# Patient Record
Sex: FEMALE | Race: BLACK OR AFRICAN AMERICAN | NOT HISPANIC OR LATINO | Employment: UNEMPLOYED | URBAN - METROPOLITAN AREA
[De-identification: names, ages, dates, MRNs, and addresses within clinical notes are randomized per-mention and may not be internally consistent; named-entity substitution may affect disease eponyms.]

---

## 2021-04-03 ENCOUNTER — APPOINTMENT (INPATIENT)
Dept: RADIOLOGY | Facility: HOSPITAL | Age: 31
DRG: 754 | End: 2021-04-03
Payer: COMMERCIAL

## 2021-04-03 ENCOUNTER — HOSPITAL ENCOUNTER (EMERGENCY)
Facility: HOSPITAL | Age: 31
End: 2021-04-03
Attending: EMERGENCY MEDICINE

## 2021-04-03 ENCOUNTER — HOSPITAL ENCOUNTER (INPATIENT)
Facility: HOSPITAL | Age: 31
LOS: 3 days | Discharge: HOME | DRG: 754 | End: 2021-04-06
Attending: PSYCHIATRY & NEUROLOGY | Admitting: PSYCHIATRY & NEUROLOGY
Payer: COMMERCIAL

## 2021-04-03 VITALS
HEART RATE: 83 BPM | OXYGEN SATURATION: 98 % | RESPIRATION RATE: 18 BRPM | SYSTOLIC BLOOD PRESSURE: 114 MMHG | TEMPERATURE: 97.6 F | DIASTOLIC BLOOD PRESSURE: 68 MMHG | WEIGHT: 220 LBS

## 2021-04-03 DIAGNOSIS — F32.9 MAJOR DEPRESSIVE DISORDER: Primary | ICD-10-CM

## 2021-04-03 DIAGNOSIS — T50.902A INTENTIONAL DRUG OVERDOSE (HCC): Primary | ICD-10-CM

## 2021-04-03 DIAGNOSIS — Z00.8 MEDICAL CLEARANCE FOR PSYCHIATRIC ADMISSION: ICD-10-CM

## 2021-04-03 DIAGNOSIS — T14.91XA SUICIDE ATTEMPT (HCC): ICD-10-CM

## 2021-04-03 DIAGNOSIS — U07.1 COVID-19: ICD-10-CM

## 2021-04-03 PROBLEM — Z98.84 H/O GASTRIC BYPASS: Status: ACTIVE | Noted: 2021-04-03

## 2021-04-03 LAB
ALBUMIN SERPL BCP-MCNC: 3.5 G/DL (ref 3.5–5)
ALP SERPL-CCNC: 81 U/L (ref 46–116)
ALT SERPL W P-5'-P-CCNC: 40 U/L (ref 12–78)
AMPHETAMINES SERPL QL SCN: NEGATIVE
ANION GAP SERPL CALCULATED.3IONS-SCNC: 12 MMOL/L (ref 4–13)
APAP SERPL-MCNC: <2 UG/ML (ref 10–20)
AST SERPL W P-5'-P-CCNC: 35 U/L (ref 5–45)
BARBITURATES UR QL: NEGATIVE
BASOPHILS # BLD AUTO: 0.01 THOUSANDS/ΜL (ref 0–0.1)
BASOPHILS NFR BLD AUTO: 0 % (ref 0–1)
BENZODIAZ UR QL: NEGATIVE
BILIRUB DIRECT SERPL-MCNC: 0.11 MG/DL (ref 0–0.2)
BILIRUB SERPL-MCNC: 0.18 MG/DL (ref 0.2–1)
BUN SERPL-MCNC: 7 MG/DL (ref 5–25)
CALCIUM SERPL-MCNC: 8.6 MG/DL (ref 8.3–10.1)
CHLORIDE SERPL-SCNC: 102 MMOL/L (ref 100–108)
CO2 SERPL-SCNC: 25 MMOL/L (ref 21–32)
COCAINE UR QL: NEGATIVE
CREAT SERPL-MCNC: 0.93 MG/DL (ref 0.6–1.3)
EOSINOPHIL # BLD AUTO: 0.07 THOUSAND/ΜL (ref 0–0.61)
EOSINOPHIL NFR BLD AUTO: 2 % (ref 0–6)
ERYTHROCYTE [DISTWIDTH] IN BLOOD BY AUTOMATED COUNT: 13.7 % (ref 11.6–15.1)
ETHANOL SERPL-MCNC: <3 MG/DL (ref 0–3)
EXT PREG TEST URINE: NEGATIVE
EXT. CONTROL ED NAV: NORMAL
FLUAV RNA RESP QL NAA+PROBE: NEGATIVE
FLUBV RNA RESP QL NAA+PROBE: NEGATIVE
GFR SERPL CREATININE-BSD FRML MDRD: 94 ML/MIN/1.73SQ M
GLUCOSE SERPL-MCNC: 128 MG/DL (ref 65–140)
HCT VFR BLD AUTO: 39.5 % (ref 34.8–46.1)
HGB BLD-MCNC: 12.4 G/DL (ref 11.5–15.4)
IMM GRANULOCYTES # BLD AUTO: 0.02 THOUSAND/UL (ref 0–0.2)
IMM GRANULOCYTES NFR BLD AUTO: 1 % (ref 0–2)
LIPASE SERPL-CCNC: 123 U/L (ref 73–393)
LYMPHOCYTES # BLD AUTO: 1.54 THOUSANDS/ΜL (ref 0.6–4.47)
LYMPHOCYTES NFR BLD AUTO: 46 % (ref 14–44)
MCH RBC QN AUTO: 29.2 PG (ref 26.8–34.3)
MCHC RBC AUTO-ENTMCNC: 31.4 G/DL (ref 31.4–37.4)
MCV RBC AUTO: 93 FL (ref 82–98)
METHADONE UR QL: NEGATIVE
MONOCYTES # BLD AUTO: 0.58 THOUSAND/ΜL (ref 0.17–1.22)
MONOCYTES NFR BLD AUTO: 17 % (ref 4–12)
NEUTROPHILS # BLD AUTO: 1.16 THOUSANDS/ΜL (ref 1.85–7.62)
NEUTS SEG NFR BLD AUTO: 34 % (ref 43–75)
NRBC BLD AUTO-RTO: 0 /100 WBCS
OPIATES UR QL SCN: NEGATIVE
OXYCODONE+OXYMORPHONE UR QL SCN: NEGATIVE
PCP UR QL: NEGATIVE
PLATELET # BLD AUTO: 235 THOUSANDS/UL (ref 149–390)
PMV BLD AUTO: 9.3 FL (ref 8.9–12.7)
POTASSIUM SERPL-SCNC: 3.5 MMOL/L (ref 3.5–5.3)
PROT SERPL-MCNC: 7.7 G/DL (ref 6.4–8.2)
RBC # BLD AUTO: 4.24 MILLION/UL (ref 3.81–5.12)
RSV RNA RESP QL NAA+PROBE: NEGATIVE
SALICYLATES SERPL-MCNC: <3 MG/DL (ref 3–20)
SARS-COV-2 RNA RESP QL NAA+PROBE: POSITIVE
SODIUM SERPL-SCNC: 139 MMOL/L (ref 136–145)
THC UR QL: NEGATIVE
WBC # BLD AUTO: 3.38 THOUSAND/UL (ref 4.31–10.16)

## 2021-04-03 PROCEDURE — 71045 X-RAY EXAM CHEST 1 VIEW: CPT

## 2021-04-03 PROCEDURE — 80048 BASIC METABOLIC PNL TOTAL CA: CPT | Performed by: EMERGENCY MEDICINE

## 2021-04-03 PROCEDURE — 82077 ASSAY SPEC XCP UR&BREATH IA: CPT | Performed by: EMERGENCY MEDICINE

## 2021-04-03 PROCEDURE — 81025 URINE PREGNANCY TEST: CPT | Performed by: EMERGENCY MEDICINE

## 2021-04-03 PROCEDURE — 0241U HB NFCT DS VIR RESP RNA 4 TRGT: CPT | Performed by: EMERGENCY MEDICINE

## 2021-04-03 PROCEDURE — 96360 HYDRATION IV INFUSION INIT: CPT

## 2021-04-03 PROCEDURE — 80307 DRUG TEST PRSMV CHEM ANLYZR: CPT | Performed by: EMERGENCY MEDICINE

## 2021-04-03 PROCEDURE — 36415 COLL VENOUS BLD VENIPUNCTURE: CPT | Performed by: EMERGENCY MEDICINE

## 2021-04-03 PROCEDURE — 96361 HYDRATE IV INFUSION ADD-ON: CPT

## 2021-04-03 PROCEDURE — 80076 HEPATIC FUNCTION PANEL: CPT | Performed by: EMERGENCY MEDICINE

## 2021-04-03 PROCEDURE — 93005 ELECTROCARDIOGRAM TRACING: CPT

## 2021-04-03 PROCEDURE — 83690 ASSAY OF LIPASE: CPT | Performed by: EMERGENCY MEDICINE

## 2021-04-03 PROCEDURE — 80179 DRUG ASSAY SALICYLATE: CPT | Performed by: EMERGENCY MEDICINE

## 2021-04-03 PROCEDURE — 80143 DRUG ASSAY ACETAMINOPHEN: CPT | Performed by: EMERGENCY MEDICINE

## 2021-04-03 PROCEDURE — 99285 EMERGENCY DEPT VISIT HI MDM: CPT

## 2021-04-03 PROCEDURE — 99253 IP/OBS CNSLTJ NEW/EST LOW 45: CPT | Performed by: INTERNAL MEDICINE

## 2021-04-03 PROCEDURE — 99285 EMERGENCY DEPT VISIT HI MDM: CPT | Performed by: EMERGENCY MEDICINE

## 2021-04-03 PROCEDURE — 85025 COMPLETE CBC W/AUTO DIFF WBC: CPT | Performed by: EMERGENCY MEDICINE

## 2021-04-03 RX ORDER — MELATONIN
2000 DAILY
Status: DISCONTINUED | OUTPATIENT
Start: 2021-04-04 | End: 2021-04-06 | Stop reason: HOSPADM

## 2021-04-03 RX ORDER — OLANZAPINE 10 MG/1
5 INJECTION, POWDER, LYOPHILIZED, FOR SOLUTION INTRAMUSCULAR
Status: CANCELLED | OUTPATIENT
Start: 2021-04-03

## 2021-04-03 RX ORDER — POLYETHYLENE GLYCOL 3350 17 G/17G
17 POWDER, FOR SOLUTION ORAL DAILY PRN
Status: CANCELLED | OUTPATIENT
Start: 2021-04-03

## 2021-04-03 RX ORDER — LORAZEPAM 2 MG/ML
2 INJECTION INTRAMUSCULAR EVERY 6 HOURS PRN
Status: DISCONTINUED | OUTPATIENT
Start: 2021-04-03 | End: 2021-04-06 | Stop reason: HOSPADM

## 2021-04-03 RX ORDER — POLYETHYLENE GLYCOL 3350 17 G/17G
17 POWDER, FOR SOLUTION ORAL DAILY PRN
Status: DISCONTINUED | OUTPATIENT
Start: 2021-04-03 | End: 2021-04-06 | Stop reason: HOSPADM

## 2021-04-03 RX ORDER — MAGNESIUM HYDROXIDE/ALUMINUM HYDROXICE/SIMETHICONE 120; 1200; 1200 MG/30ML; MG/30ML; MG/30ML
30 SUSPENSION ORAL EVERY 4 HOURS PRN
Status: DISCONTINUED | OUTPATIENT
Start: 2021-04-03 | End: 2021-04-06 | Stop reason: HOSPADM

## 2021-04-03 RX ORDER — ACETAMINOPHEN 325 MG/1
975 TABLET ORAL EVERY 6 HOURS PRN
Status: DISCONTINUED | OUTPATIENT
Start: 2021-04-03 | End: 2021-04-06 | Stop reason: HOSPADM

## 2021-04-03 RX ORDER — BENZTROPINE MESYLATE 1 MG/ML
1 INJECTION INTRAMUSCULAR; INTRAVENOUS EVERY 6 HOURS PRN
Status: DISCONTINUED | OUTPATIENT
Start: 2021-04-03 | End: 2021-04-06 | Stop reason: HOSPADM

## 2021-04-03 RX ORDER — OLANZAPINE 2.5 MG/1
5 TABLET ORAL
Status: CANCELLED | OUTPATIENT
Start: 2021-04-03

## 2021-04-03 RX ORDER — MAGNESIUM HYDROXIDE/ALUMINUM HYDROXICE/SIMETHICONE 120; 1200; 1200 MG/30ML; MG/30ML; MG/30ML
30 SUSPENSION ORAL EVERY 4 HOURS PRN
Status: CANCELLED | OUTPATIENT
Start: 2021-04-03

## 2021-04-03 RX ORDER — ACETAMINOPHEN 325 MG/1
975 TABLET ORAL EVERY 6 HOURS PRN
Status: CANCELLED | OUTPATIENT
Start: 2021-04-03

## 2021-04-03 RX ORDER — ACETAMINOPHEN 325 MG/1
650 TABLET ORAL EVERY 4 HOURS PRN
Status: DISCONTINUED | OUTPATIENT
Start: 2021-04-03 | End: 2021-04-06 | Stop reason: HOSPADM

## 2021-04-03 RX ORDER — ACETAMINOPHEN 325 MG/1
650 TABLET ORAL EVERY 4 HOURS PRN
Status: CANCELLED | OUTPATIENT
Start: 2021-04-03

## 2021-04-03 RX ORDER — OLANZAPINE 5 MG/1
5 TABLET ORAL
Status: DISCONTINUED | OUTPATIENT
Start: 2021-04-03 | End: 2021-04-06 | Stop reason: HOSPADM

## 2021-04-03 RX ORDER — BENZTROPINE MESYLATE 1 MG/ML
1 INJECTION INTRAMUSCULAR; INTRAVENOUS EVERY 6 HOURS PRN
Status: CANCELLED | OUTPATIENT
Start: 2021-04-03

## 2021-04-03 RX ORDER — ASCORBIC ACID 500 MG
1000 TABLET ORAL 2 TIMES DAILY
Status: DISCONTINUED | OUTPATIENT
Start: 2021-04-03 | End: 2021-04-06 | Stop reason: HOSPADM

## 2021-04-03 RX ORDER — OLANZAPINE 10 MG/1
5 INJECTION, POWDER, LYOPHILIZED, FOR SOLUTION INTRAMUSCULAR
Status: DISCONTINUED | OUTPATIENT
Start: 2021-04-03 | End: 2021-04-06 | Stop reason: HOSPADM

## 2021-04-03 RX ORDER — LORAZEPAM 2 MG/ML
2 INJECTION INTRAMUSCULAR EVERY 6 HOURS PRN
Status: CANCELLED | OUTPATIENT
Start: 2021-04-03

## 2021-04-03 RX ORDER — ZINC SULFATE 50(220)MG
220 CAPSULE ORAL DAILY
Status: DISCONTINUED | OUTPATIENT
Start: 2021-04-04 | End: 2021-04-06 | Stop reason: HOSPADM

## 2021-04-03 RX ORDER — GABAPENTIN 300 MG/1
300 CAPSULE ORAL EVERY 6 HOURS PRN
Status: DISCONTINUED | OUTPATIENT
Start: 2021-04-03 | End: 2021-04-06 | Stop reason: HOSPADM

## 2021-04-03 RX ORDER — LOPERAMIDE HYDROCHLORIDE 2 MG/1
2 CAPSULE ORAL ONCE
Status: COMPLETED | OUTPATIENT
Start: 2021-04-03 | End: 2021-04-03

## 2021-04-03 RX ORDER — GABAPENTIN 300 MG/1
300 CAPSULE ORAL EVERY 6 HOURS PRN
Status: CANCELLED | OUTPATIENT
Start: 2021-04-03

## 2021-04-03 RX ADMIN — ACTIVATED CHARCOAL 25 G: 208 SUSPENSION ORAL at 00:55

## 2021-04-03 RX ADMIN — SODIUM CHLORIDE 1000 ML: 0.9 INJECTION, SOLUTION INTRAVENOUS at 00:55

## 2021-04-03 RX ADMIN — LOPERAMIDE HYDROCHLORIDE 2 MG: 2 CAPSULE ORAL at 15:14

## 2021-04-03 NOTE — ED CARE HANDOFF
Emergency Department Sign Out Note        Sign out and transfer of care from [place]  See Separate Emergency Department note  The patient, Gatrh Ruffin, was evaluated by the previous provider for SI - intentional OD on Trazadone  Workup Completed:  Medical clearance after trazodone overdose  ED Course / Workup Pending (followup): Patient has repeat EKG at noon, medically cleared for psychiatric care  However during a screening, patient is found to be COVID positive  Patient will require a COVID positive bed for placement  A 302 has been signed if needed as this patient requires inpatient psychiatric care after intentional overdose  ED Course as of Apr 03 1558   Sat Apr 03, 2021   1238 Repeat EKG, normal QT  Patient is medically cleared for the bubble  Awaiting urine for rapid drug screen and urine pregnancy test   COVID swab pending  1316 SARS-COV-2(!): Positive     Procedures  MDM    Disposition  Final diagnoses:   Intentional drug overdose ([facility] [address] )   Suicide attempt [facility])     Time reflects when diagnosis was documented in both MDM as applicable and the Disposition within this note     Time User Action Codes Description Comment    4/3/2021 12:59 AM Tosin Serum Add [T50 902A] Intentional drug overdose ([facility] [address] )     4/3/2021 12:59 AM Tosin Serum Add Isaac Eng Suicide attempt [facility])       ED Disposition     ED Disposition Condition Date/Time Comment    Transfer to [facility] Apr 3, 2021 12:59 AM Garth Ruffin should be transferred out to [facility] and has been medically cleared  MD Documentation      Most Recent Value   Sending MD Dr Rajwinder Ordonez    None       Patient's Medications    No medications on file     No discharge procedures on file         ED Provider  Electronically Signed by     Srinivas Nunez DO  04/04/21 7503

## 2021-04-03 NOTE — ED PROVIDER NOTES
History  Chief Complaint   Patient presents with    Overdose - Intentional     Pt states that she took 20 Trazadone along with drinking Hennasy approx 30 min ago with the intent of killing herself  25-year-old female presents for evaluation after an intentional overdose  The patient states that she took 20 pills of trazodone (patient is unsure of dose) along with about a cup of cognac about 35 minutes prior to arrival   She states that she was drinking and took the medication because I do not want to be here anymore        History provided by:  Patient   used: No    Overdose - Intentional  Ingested substance:  Alcohol and prescription medication  Time since incident:  35 minutes  Ingestion amount:  "20 pills"  Witnesses present: no    Called poison control: yes    Incident location: hotel  Context: depression, intentional ingestion and suicide attempt        None       History reviewed  No pertinent past medical history  Past Surgical History:   Procedure Laterality Date    BARIATRIC SURGERY      gastric sleeve     SECTION         History reviewed  No pertinent family history  I have reviewed and agree with the history as documented      E-Cigarette/Vaping    E-Cigarette Use Current Every Day User      E-Cigarette/Vaping Substances    Nicotine No     THC No     CBD Yes     Flavoring No     Other No     Unknown No      Social History     Tobacco Use    Smoking status: Never Smoker    Smokeless tobacco: Never Used   Substance Use Topics    Alcohol use: Yes     Frequency: 2-4 times a month     Drinks per session: 3 or 4    Drug use: Yes     Types: Marijuana       Review of Systems    Physical Exam  Physical Exam    Vital Signs  ED Triage Vitals [21 0028]   Temperature Pulse Respirations Blood Pressure SpO2   97 6 °F (36 4 °C) 92 18 126/80 98 %      Temp Source Heart Rate Source Patient Position - Orthostatic VS BP Location FiO2 (%)   Tympanic Monitor Sitting Left arm --      Pain Score       No Pain           Vitals:    04/03/21 0028   BP: 126/80   Pulse: 92   Patient Position - Orthostatic VS: Sitting         Visual Acuity      ED Medications  Medications   sodium chloride 0 9 % bolus 1,000 mL (has no administration in time range)   charcoal activated ([place] INSTA-LEONELA) oral liquid 25 g (has no administration in time range)       Diagnostic Studies  Results Reviewed     Procedure Component Value Units Date/Time    Acetaminophen level-If concentration is detectable, please discuss with medical  on call  [[id number]]     Lab Status: No result Specimen: Blood     Basic metabolic panel [[id number]]     Lab Status: No result Specimen: Blood     Hepatic function panel [[id number]]     Lab Status: No result Specimen: Blood     CBC and differential [[id number]]     Lab Status: No result Specimen: Blood     Lipase [[id number]]     Lab Status: No result Specimen: Blood     Ethanol [[id number]]     Lab Status: No result Specimen: Blood     Salicylate level [[id number]]     Lab Status: No result Specimen: Blood                  No orders to display              Procedures  Procedures         ED Course                                           MDM    Disposition  Final diagnoses:   None     ED Disposition     None      Follow-up Information    None         Patient's Medications    No medications on file     No discharge procedures on file      PDMP Review     None          ED Provider  Electronically Signed by

## 2021-04-03 NOTE — LETTER
[address] [address]  [address] [place] [address]  Dept: [phone]      [id number] TRANSFER CONSENT    NAME Tarah BRADSHAW 1990                              MRN [id number]    I have been informed of my rights regarding examination, treatment, and transfer   by Dr Kaya Anna MD    Benefits: Specialized equipment and/or services available at the receiving facility (Include comment)________________________(inpatient behavioral health treatment)    Risks: Potential for delay in receiving treatment      Transfer Request   I acknowledge that my medical condition has been evaluated and explained to me by the emergency department physician or other qualified medical person and/or my attending physician who has recommended and offered to me further medical examination and treatment  I understand the Hospital's obligation with respect to the treatment and stabilization of my emergency medical condition  I nevertheless request to be transferred  I release the Hospital, the doctor, and any other persons caring for me from all responsibility or liability for any injury or ill effects that may result from my transfer and agree to accept all responsibility for the consequences of my choice to transfer, rather than receive stabilizing treatment at the Hospital  I understand that because the transfer is my request, my insurance may not provide reimbursement for the services  The Hospital will assist and direct me and my family in how to make arrangements for transfer, but the hospital is not liable for any fees charged by the transport service  In spite of this understanding, I refuse to consent to further medical examination and treatment which has been offered to me, and request transfer to  [address] [place], [place]   I authorize the performance of emergency medical procedures and treatments upon me in both transit and upon arrival at the receiving facility  Additionally, I authorize the release of any and all medical records to the receiving facility and request they be transported with me, if possible  I authorize the performance of emergency medical procedures and treatments upon me in both transit and upon arrival at the receiving facility  Additionally, I authorize the release of any and all medical records to the receiving facility and request they be transported with me, if possible  I understand that the safest mode of transportation during a medical emergency is an ambulance and that the Hospital advocates the use of this mode of transport  Risks of traveling to the receiving facility by car, including absence of medical control, life sustaining equipment, such as oxygen, and medical personnel has been explained to me and I fully understand them  (ROSLYN CORRECT BOX BELOW)  [x ]  I consent to the stated transfer and to be transported by ambulance/helicopter  [  ]  I consent to the stated transfer, but refuse transportation by ambulance and accept full responsibility for my transportation by car  I understand the risks of non-ambulance transfers and I exonerate the Hospital and its staff from any deterioration in my condition that results from this refusal     X___________________________________________    DATE  21  TIME________  Signature of patient or legally responsible individual signing on patient behalf           RELATIONSHIP TO PATIENT_________________________          Provider Certification    NAME [place]                                         1990                              MRN [id number]    A medical screening exam was performed on the above named patient  Based on the examination:    Condition Necessitating Transfer The primary encounter diagnosis was Intentional drug overdose ([facility] [address] )   A diagnosis of Suicide attempt [facility]) was also pertinent to this visit  Patient Condition: The patient has been stabilized such that within reasonable medical probability, no material deterioration of the patient condition or the condition of the unborn child(sundar) is likely to result from the transfer    Reason for Transfer: Level of Care needed not available at this facility    Transfer Requirements: Facility [address] [facility], [address] [place], [place]   · Space available and qualified personnel available for treatment as acknowledged by Charmayne Foil, [phone]  · Agreed to accept transfer and to provide appropriate medical treatment as acknowledged by       Dr Valerie Josue  · Appropriate medical records of the examination and treatment of the patient are provided at the time of transfer   [address] [facility],[address] _______  · Transfer will be performed by qualified personnel from                              and appropriate transfer equipment as required, including the use of necessary and appropriate life support measures  Provider Certification: I have examined the patient and explained the following risks and benefits of being transferred/refusing transfer to the patient/family:  General risk, such as traffic hazards, adverse weather conditions, rough terrain or turbulence, possible failure of equipment (including vehicle or aircraft), or consequences of actions of persons outside the control of the transport personnel      Based on these reasonable risks and benefits to the patient and/or the unborn child(sundar), and based upon the information available at the time of the patients examination, I certify that the medical benefits reasonably to be expected from the provision of appropriate medical treatments at another medical facility outweigh the increasing risks, if any, to the individuals medical condition, and in the case of labor to the unborn child, from effecting the transfer      X____________________________________________ DATE 04/03/21        TIME_______      ORIGINAL - SEND TO MEDICAL RECORDS   COPY - SEND WITH PATIENT DURING TRANSFER
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
## 2021-04-03 NOTE — ED NOTES
[facility] is accepted at [facility]  Patient is accepted by Dr Jose Alejandro Birmingham per Cruz Gonzalez, Intake  Transportation is arranged with [facility]  Transportation is scheduled for *1920  Patient may go to the floor at upon arrival       Nurse report is to be called to [phone] prior to patient transfer

## 2021-04-03 NOTE — ED NOTES
Pt presented to the ED from home accompanied by boyfriend after allegedly ingesting aprox 20 trazodone pills (unknown dosage) impulsively  Pt reports having a hx of IP tx, OP svcs, and SA's in the past  Pt reports having a dx of schizophrenia  Pt denies any hx of self-injurious behaviors  Pt is cooperative, maintains fair eye contact and engages in conversation  Pt reports "she impulsively ingested the pills due to things just piling up and feeling hopeless"  Pt denies AVH's  Pt reports being prescribed Wellbutrin but not taking it for aprox 4 mo's ago due to "not liking the way it made her feel"  Pt reports poor appetite and has times "where is she awake 2 days in a row"  Pt reports a poor appetite and not always able to hold food down  Pt adds, "I don't eat much but I also had gastric sleeve procedure done in the past  I usually dont have an appetite"  Pt reports having had OP svcs but then they were canceled due to [place] pandemic and never re-scheduled  Pt denies legal issues, use of tobacco products, and confirms occasional use of THC  Pt uses ETOH socially  Pt reports currently residing w/boyfriend at a hotel in [address] [place] [address] at this time due to recent move from [place] to [place]  Pt is willing to sign a 201 committment  CW read and reviewed rights w/pt  Dr Myron Lowe and pt have completed 201 documents  CW notes pt is COVID-19 Positive and will require in network placement  As per intake, in network beds are available at this time  CW sent clinicals for review      TDS, CW

## 2021-04-03 NOTE — ED NOTES
CW provided tech w/cup of water for pt  Intake is reviewing pt's chart  Intake is currently awaiting lab results to present to doctor      TDS, CW

## 2021-04-04 PROBLEM — F32.9 MAJOR DEPRESSIVE DISORDER: Status: ACTIVE | Noted: 2021-04-04

## 2021-04-04 PROBLEM — F43.10 PTSD (POST-TRAUMATIC STRESS DISORDER): Status: ACTIVE | Noted: 2021-04-04

## 2021-04-04 LAB
CK SERPL-CCNC: 43 U/L (ref 30–135)
CRP SERPL QL: 7.7 MG/L
D DIMER PPP FEU-MCNC: 0.57 UG/ML FEU
FERRITIN SERPL-MCNC: 40 NG/ML (ref 8–388)
NT-PROBNP SERPL-MCNC: 18.7 PG/ML (ref 0–299)
PROCALCITONIN SERPL-MCNC: <0.05 NG/ML
TROPONIN I SERPL-MCNC: <0.01 NG/ML (ref 0–0.03)

## 2021-04-04 PROCEDURE — 82728 ASSAY OF FERRITIN: CPT | Performed by: INTERNAL MEDICINE

## 2021-04-04 PROCEDURE — 82550 ASSAY OF CK (CPK): CPT | Performed by: INTERNAL MEDICINE

## 2021-04-04 PROCEDURE — 86140 C-REACTIVE PROTEIN: CPT | Performed by: INTERNAL MEDICINE

## 2021-04-04 PROCEDURE — 93005 ELECTROCARDIOGRAM TRACING: CPT

## 2021-04-04 PROCEDURE — 99221 1ST HOSP IP/OBS SF/LOW 40: CPT | Performed by: PSYCHIATRY & NEUROLOGY

## 2021-04-04 PROCEDURE — 83880 ASSAY OF NATRIURETIC PEPTIDE: CPT | Performed by: INTERNAL MEDICINE

## 2021-04-04 PROCEDURE — 84145 PROCALCITONIN (PCT): CPT | Performed by: INTERNAL MEDICINE

## 2021-04-04 PROCEDURE — 84484 ASSAY OF TROPONIN QUANT: CPT | Performed by: INTERNAL MEDICINE

## 2021-04-04 PROCEDURE — 85379 FIBRIN DEGRADATION QUANT: CPT | Performed by: INTERNAL MEDICINE

## 2021-04-04 RX ORDER — FLUOXETINE HYDROCHLORIDE 20 MG/1
20 CAPSULE ORAL DAILY
Status: DISCONTINUED | OUTPATIENT
Start: 2021-04-04 | End: 2021-04-06 | Stop reason: HOSPADM

## 2021-04-04 RX ADMIN — FLUOXETINE 20 MG: 20 CAPSULE ORAL at 13:43

## 2021-04-04 RX ADMIN — OXYCODONE HYDROCHLORIDE AND ACETAMINOPHEN 1000 MG: 500 TABLET ORAL at 08:02

## 2021-04-04 RX ADMIN — OXYCODONE HYDROCHLORIDE AND ACETAMINOPHEN 1000 MG: 500 TABLET ORAL at 17:05

## 2021-04-04 RX ADMIN — ZINC SULFATE 220 MG (50 MG) CAPSULE 220 MG: CAPSULE at 08:02

## 2021-04-04 RX ADMIN — CHOLECALCIFEROL (VITAMIN D3) 2000 UNITS: 25 TAB ORAL at 08:02
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
## 2021-04-04 NOTE — ASSESSMENT & PLAN NOTE
· Patient has a history of gastric sleeve in 2017  · Patient is requesting a regular diet with normal portions; however, she was encouraged to eat small amounts throughout the day

## 2021-04-04 NOTE — NURSING NOTE
Pt is calm and cooperative  Pt reports depression and anxiety, denies SI  Pt is pleasant when approached, reports she wants help with her depression  Pt cooperative with EKG and blood work, hesitant to take Prozac, she is compliant with all medications

## 2021-04-04 NOTE — ASSESSMENT & PLAN NOTE
 The patient was evaluated and is medically clear for admission to the [facility] for treatment of the underlying psychiatric illness

## 2021-04-04 NOTE — TREATMENT PLAN
TREATMENT PLAN REVIEW - [facility] Jina Cespedes 26 y o  1990 female MRN: [id number]    [address] Room / Bed: Yusuf Sorto/OABHU 221-42 Encounter: [id number]        Admit Date/Time:  4/3/2021  8:33 PM    Treatment Team: Attending Provider: Ivana Ryder MD; Physician Assistant: Merrill Carrillo PA-C; Patient Care Assistant: Arnaldo Miller; Registered Nurse: Lucius Pringle RN; Resident: Gaye Mata DO; Patient Care Assistant: Meche Ellington    Diagnosis: Principal Problem:    Major depressive disorder  Active Problems:    Medical clearance for psychiatric admission    COVID-19    H/O gastric bypass    PTSD (post-traumatic stress disorder)    Patient Strengths/Assets: average or above intelligence, capable of independent living, cooperative, communication skills      Patient Barriers/Limitations: chronic mental illness    Short Term Goals: decrease in depressive symptoms, decrease in anxiety symptoms    Long Term Goals: improvement in depression, improvement in anxiety, free of suicidal thoughts    Progress Towards Goals: starting psychiatric medications as prescribed    Recommended Treatment: medication management, patient medication education, group therapy, milieu therapy, continued Behavioral Health psychiatric evaluation/assessment process     Treatment Frequency: daily medication monitoring, group and milieu therapy daily, monitoring through interdisciplinary rounds, monitoring through weekly patient care conferences    Expected Discharge Date: 7 days - 4/11/2021    Discharge Plan: return to previous living arrangement    Treatment Plan Created/Updated By: Gaye Mata DO

## 2021-04-04 NOTE — CONSULTS
Mandi Smith 1990, 26 y o  female MRN: [id number]  Unit/Bed#: Rebecca Ricketts 785-28 Encounter: [id number]  Primary Care Provider: No primary care provider on file  Date and time admitted to hospital: 4/3/2021  8:33 PM    Inpatient consult for Medical Clearance for [facility] patient  Consult performed by: Barby Wright PA-C  Consult ordered by: Eugenio Ballard MD          Medical clearance for psychiatric admission  Assessment & Plan   The patient was evaluated and is medically clear for admission to the [facility] for treatment of the underlying psychiatric illness  COVID-19  Assessment & Plan  · Patient tested positive for COVID-19 on 04/03/2021  Patient currently lives in a hotel in [facility] as she is moving from [address] [place] [address] to [address] [place] [address] and currently looking for permanent housing  She is unaware of any COVID positive contacts  Patient started with upper respiratory symptoms yesterday  Admitted under mild pathway  Oxygen requirement: RA  Diagnostics: Ordered for the AM  Troponin:   Procalcitonin:   D-dimer:   CPK:   CRP:  Ferritin:   ProBNP:  CXR:   Treatment:  Vitamin-C, vitamin-D and zinc (continue through 4/10/2021)  Supportive care      H/O gastric bypass  Assessment & Plan  · Patient has a history of gastric sleeve in 2017  · Patient is requesting a regular diet with normal portions; however, she was encouraged to eat small amounts throughout the day  ECT Clearance:   History of recent seizure or stroke:  No   History of pheochromocytoma:  No   History of active bleeding (Intracranial hemorrhage, aneurysm or AVM):  No   History of metallic implants in the head or neck:  No   History of increased intracranial pressure with mass effect:  No   Does the patient have a current arrhythmia? No      Based on above criteria, Patient is medically cleared for ECT should it be recommended  Counseling / Coordination of Care Time: 30 minutes    Greater than 50% of total time spent on patient counseling and coordination of care  Collaboration of Care: Were Recommendations Directly Discussed with Primary Treatment Team? - Yes     History of Present Illness:    Israel Xiao is a 26 y o  female who is originally admitted to the psychiatry service due to intentional overdose with trazodone  Patient admits to taking 20 trazodone along with drinking Hennasy in attempt to kill herself  Patient was given sodium chloride bolus and activated charcoal on arrival to the ED  We are consulted for medical clearance for admission to [facility] and treatment of underlying psychiatric illness  Patient tested positive for COVID-19 on 04/03/2021  Patient currently resides in a [facility] as she is currently looking for more permanent housing from her move to [place] from [facility]  Patient denies any COVID positive direct contacts  Patient is currently complaining of congestion, dry cough, chest pain when she coughs,  diarrhea  Patient denies headaches, dizziness, nausea, vomiting, constipation, urinary symptoms at this time  Patient has an unremarkable past medical history  Review of Systems:    Review of Systems   Constitutional: Positive for fatigue  Negative for chills and fever  HENT: Positive for congestion and sore throat  Negative for ear pain  Eyes: Negative for pain and visual disturbance  Respiratory: Positive for cough  Negative for shortness of breath  Cardiovascular: Positive for chest pain  Negative for palpitations  Gastrointestinal: Positive for diarrhea  Negative for abdominal pain, constipation, nausea and vomiting  Genitourinary: Negative for dysuria and hematuria  Musculoskeletal: Negative for arthralgias and back pain  Skin: Negative for color change and rash  Neurological: Negative for dizziness, seizures, syncope and headaches  Psychiatric/Behavioral: Positive for dysphoric mood     All other systems reviewed and are negative  Past Medical and Surgical History:     Past Medical History:   Diagnosis Date    Anxiety     Depression     Schizophrenia [facility])        Past Surgical History:   Procedure Laterality Date    BARIATRIC SURGERY      gastric sleeve     SECTION         Meds/Allergies:    all medications and allergies reviewed    Allergies: No Known Allergies    Social History:     Marital Status: Single    Substance Use History:   Social History     Substance and Sexual Activity   Alcohol Use Yes    Frequency: 2-4 times a month    Drinks per session: 3 or 4    Comment: Pt reports "social drinker"     Social History     Tobacco Use   Smoking Status Never Smoker   Smokeless Tobacco Never Used     Social History     Substance and Sexual Activity   Drug Use Yes    Types: Marijuana    Comment: Pt reports using occasional THC "which helps her anxiety"       Family History:    No family history on file  Physical Exam:     Vitals:   Blood Pressure: 110/67 (21)  Pulse: 80 (21)  Temperature: 98 °F (36 7 °C) (21)  Temp Source: Tympanic (21)  Respirations: 16 (21)  Height: 5' 3" (160 cm) (21)  Weight - Scale: 99 8 kg (220 lb) (21)  SpO2: 97 % (21)    Physical Exam  Vitals signs reviewed  Constitutional:       General: She is not in acute distress  HENT:      Head: Normocephalic and atraumatic  Nose: Congestion present  Mouth/Throat:      Mouth: Mucous membranes are moist       Pharynx: Oropharynx is clear  Eyes:      General: No scleral icterus  Pupils: Pupils are equal, round, and reactive to light  Cardiovascular:      Rate and Rhythm: Normal rate and regular rhythm  Heart sounds: No murmur  Pulmonary:      Effort: Pulmonary effort is normal       Breath sounds: No wheezing or rales  Comments: Decreased breath sounds in bilateral lower lung areas    Abdominal:      General: Bowel sounds are normal  There is no distension  Palpations: Abdomen is soft  Tenderness: There is no abdominal tenderness  Musculoskeletal: Normal range of motion  Right lower leg: No edema  Left lower leg: No edema  Skin:     General: Skin is warm and dry  Findings: No rash  Neurological:      Mental Status: She is alert and oriented to person, place, and time  Psychiatric:         Mood and Affect: Mood is depressed  Additional Data:     Lab Results: I have personally reviewed pertinent reports  Results from last 7 days   Lab Units 04/03/21  0050   WBC Thousand/uL 3 38*   HEMOGLOBIN g/dL 12 4   HEMATOCRIT % 39 5   PLATELETS Thousands/uL 235   NEUTROS PCT % 34*   LYMPHS PCT % 46*   MONOS PCT % 17*   EOS PCT % 2     Results from last 7 days   Lab Units 04/03/21  0050   SODIUM mmol/L 139   POTASSIUM mmol/L 3 5   CHLORIDE mmol/L 102   CO2 mmol/L 25   BUN mg/dL 7   CREATININE mg/dL 0 93   ANION GAP mmol/L 12   CALCIUM mg/dL 8 6   ALBUMIN g/dL 3 5   TOTAL BILIRUBIN mg/dL 0 18*   ALK PHOS U/L 81   ALT U/L 40   AST U/L 35   GLUCOSE RANDOM mg/dL 128             No results found for: HGBA1C        EKG, Pathology, and Other Studies Reviewed on Admission:   · EKG: None new    ** Please Note: This note has been constructed using a voice recognition system   **

## 2021-04-04 NOTE — ASSESSMENT & PLAN NOTE
· Patient tested positive for COVID-19 on 04/03/2021  Patient currently lives in a hotel in [facility] as she is moving from [address] [place] [address] to [place] and currently looking for permanent housing  She is unaware of any COVID positive contacts  Patient started with upper respiratory symptoms yesterday  Admitted under mild pathway    Oxygen requirement: RA  Diagnostics: Ordered for the AM  Troponin:   Procalcitonin:   D-dimer:   CPK:   CRP:  Ferritin:   ProBNP:  CXR:   Treatment:  Vitamin-C, vitamin-D and zinc (continue through 4/10/2021)  Supportive care

## 2021-04-04 NOTE — NURSING NOTE
Pt admitted on 201 this evening  She was presented to the [facility] ED the day before accompanied by boyfriend after allegedly ingesting  20 trazodone pills 100 mg each( per patient)  Hx of schizophrenia (recently diagnosed) and suicidal attempts in the past      During the interview pt is oriented, cooperative, maintains good eye contact and engages in conversation  Pt reported having very mild depression and 4/4 anxiety, Denied SI/HI, delusions or hallucinations  Reported mild pain in a left side and back while breathing, dry cough and congestion  Pt denied fever, N/V, or sore throat  Pt was found to be COVID +  Pt reports being prescribed Wellbutrin but not taking it  b/c the way med made her feel  Pt reported poor appetite, could only have small amount of food at a time due to gastric sleeve surgery in the past  Pt occasionally  uses tobacco  and THC  Pt uses alcohol socially  Pt reports currently residing with boyfriend at a hotel in [place] due to recent move from [place] to [place]  Pt has 2 kids living with her mother  Patient refused pain med or med for anxiety  Stated she was feeling safe

## 2021-04-05 PROBLEM — R51.9 HEADACHE ABOVE THE EYE REGION: Status: ACTIVE | Noted: 2021-04-05

## 2021-04-05 LAB
ATRIAL RATE: 76 BPM
ATRIAL RATE: 85 BPM
ATRIAL RATE: 88 BPM
P AXIS: 56 DEGREES
P AXIS: 57 DEGREES
P AXIS: 78 DEGREES
PR INTERVAL: 132 MS
PR INTERVAL: 142 MS
PR INTERVAL: 154 MS
PROCALCITONIN SERPL-MCNC: <0.05 NG/ML
QRS AXIS: 48 DEGREES
QRS AXIS: 48 DEGREES
QRS AXIS: 72 DEGREES
QRSD INTERVAL: 86 MS
QRSD INTERVAL: 88 MS
QRSD INTERVAL: 96 MS
QT INTERVAL: 358 MS
QT INTERVAL: 372 MS
QT INTERVAL: 378 MS
QTC INTERVAL: 418 MS
QTC INTERVAL: 433 MS
QTC INTERVAL: 449 MS
T WAVE AXIS: -9 DEGREES
T WAVE AXIS: 22 DEGREES
T WAVE AXIS: 9 DEGREES
VENTRICULAR RATE: 76 BPM
VENTRICULAR RATE: 85 BPM
VENTRICULAR RATE: 88 BPM

## 2021-04-05 PROCEDURE — 84145 PROCALCITONIN (PCT): CPT | Performed by: INTERNAL MEDICINE

## 2021-04-05 PROCEDURE — 93010 ELECTROCARDIOGRAM REPORT: CPT | Performed by: INTERNAL MEDICINE

## 2021-04-05 PROCEDURE — 99232 SBSQ HOSP IP/OBS MODERATE 35: CPT | Performed by: PSYCHIATRY & NEUROLOGY

## 2021-04-05 PROCEDURE — 99232 SBSQ HOSP IP/OBS MODERATE 35: CPT | Performed by: NURSE PRACTITIONER

## 2021-04-05 RX ORDER — ECHINACEA PURPUREA EXTRACT 125 MG
1 TABLET ORAL EVERY 2 HOUR PRN
Status: DISCONTINUED | OUTPATIENT
Start: 2021-04-05 | End: 2021-04-06 | Stop reason: HOSPADM

## 2021-04-05 RX ORDER — FLUTICASONE PROPIONATE 50 MCG
1 SPRAY, SUSPENSION (ML) NASAL DAILY
Status: DISCONTINUED | OUTPATIENT
Start: 2021-04-05 | End: 2021-04-06 | Stop reason: HOSPADM

## 2021-04-05 RX ADMIN — OXYCODONE HYDROCHLORIDE AND ACETAMINOPHEN 1000 MG: 500 TABLET ORAL at 17:14

## 2021-04-05 RX ADMIN — OXYCODONE HYDROCHLORIDE AND ACETAMINOPHEN 1000 MG: 500 TABLET ORAL at 08:40

## 2021-04-05 RX ADMIN — FLUTICASONE PROPIONATE 1 SPRAY: 50 SPRAY, METERED NASAL at 17:19

## 2021-04-05 RX ADMIN — CHOLECALCIFEROL (VITAMIN D3) 2000 UNITS: 25 TAB ORAL at 08:40

## 2021-04-05 RX ADMIN — FLUOXETINE 20 MG: 20 CAPSULE ORAL at 08:40

## 2021-04-05 RX ADMIN — ZINC SULFATE 220 MG (50 MG) CAPSULE 220 MG: CAPSULE at 08:40

## 2021-04-05 NOTE — NURSING NOTE
Patient reported having diarrhea about 9 times within last 24 hours for not being on her"regular eating regime"  Pt was wondering if she could pay and get her soup delivered last night  She reported not sleeping well  Pt remains calm and  pleasant, compliant with care

## 2021-04-05 NOTE — TREATMENT TEAM
04/05/21 1231   Team Meeting   Meeting Type Tx Team Meeting   Initial Conference Date 04/05/21   Team Members Present   Team Members Present Physician;Nurse;   Physician Team Member Dr Halina Orr Team Member Rafal Mcghee RN   Care Management Team Member Earline Vickers   Patient/Family Present   Patient Present Yes   Patient's Family Present No     Treatment plan reviewed; pt in agreement with goals although remains focused on d/c, denies need for ongoing hospitalization    Goals  Decrease depression  Decrease anxiety   Discharge planning

## 2021-04-05 NOTE — PROGRESS NOTES
[facility]  Progress Note - Sofy Bentley 1990, 26 y o  female MRN: [id number]  [facility] Encounter: [id number]  Primary Care Provider: No primary care provider on file  Date and time admitted to hospital: 4/3/2021  8:33 PM    Headache above the eye region  Assessment & Plan  · Patient complained of headache immediately above the orbits of the eyes  · Patient rating her headache 5/10 and describes the pain mostly as pressure  · VSS  · Order Flonase 50 mcg per spray 1 spray each nares daily  · Order Ocean nasal spray 0 65% 1 spray each naris q 2 hours p r n   · Encourage use of p r n  Medication Tylenol        Nurse Coordination of Care Discussion:  Discussed with treatment team RN    Discussions with Specialists or Other Care Team Provider:  None    Education and Discussions with Family / Patient:  All the patient's questions were answered to the best of my abilities    Time Spent for Care: 20 minutes  More than 50% of total time spent on counseling and coordination of care as described above  Current Length of Stay: 2 day(s)    Code Status: Level 1 - Full Code      Subjective:   Notified by nursing of patient complaints with pressure/headache above bilateral orbits of the eyes  Patient states this was present upon admission, rates the pain at a 5/10 and describes the pain as increased pressure with dull pain  Patient states pain is relieved with lying down with her head of bed elevated  She states the pain waxes and wanes in its course throughout the day with no specific time of day for increasing symptoms  Objective:     Vitals:   Temp (24hrs), Av 6 °F (36 4 °C), Min:96 8 °F (36 °C), Max:98 8 °F (37 1 °C)    Temp:  [96 8 °F (36 °C)-98 8 °F (37 1 °C)] 96 8 °F (36 °C)  HR:  [74-90] 86  Resp:  [16-18] 18  BP: ()/(63-92) 151/92  SpO2:  [94 %-99 %] 99 %  Body mass index is 38 97 kg/m²         Physical Exam:     Physical Exam  Vitals signs and nursing note reviewed  Constitutional:       General: She is not in acute distress  Appearance: Normal appearance  She is obese  She is not ill-appearing or diaphoretic  HENT:      Head: Normocephalic and atraumatic  No raccoon eyes, Lu's sign, right periorbital erythema or left periorbital erythema  Nose: Congestion present  Mouth/Throat:      Mouth: Mucous membranes are moist       Pharynx: Oropharynx is clear  Eyes:      General: No scleral icterus  Right eye: No discharge  Left eye: No discharge  Extraocular Movements: Extraocular movements intact  Conjunctiva/sclera: Conjunctivae normal       Pupils: Pupils are equal, round, and reactive to light  Neck:      Musculoskeletal: Normal range of motion  Pulmonary:      Effort: Pulmonary effort is normal    Musculoskeletal: Normal range of motion  Skin:     General: Skin is warm and dry  Neurological:      General: No focal deficit present  Mental Status: She is alert and oriented to person, place, and time  Mental status is at baseline  Psychiatric:         Attention and Perception: Attention and perception normal          Mood and Affect: Mood is anxious  Speech: Speech normal          Behavior: Behavior normal  Behavior is cooperative  Thought Content: Thought content normal          Cognition and Memory: Cognition and memory normal          Judgment: Judgment is impulsive             Additional Data:     Labs:    Results from last 7 days   Lab Units 04/03/21  0050   WBC Thousand/uL 3 38*   HEMOGLOBIN g/dL 12 4   HEMATOCRIT % 39 5   PLATELETS Thousands/uL 235   NEUTROS PCT % 34*   LYMPHS PCT % 46*   MONOS PCT % 17*   EOS PCT % 2     Results from last 7 days   Lab Units 04/03/21  0050   SODIUM mmol/L 139   POTASSIUM mmol/L 3 5   CHLORIDE mmol/L 102   CO2 mmol/L 25   BUN mg/dL 7   CREATININE mg/dL 0 93   ANION GAP mmol/L 12   CALCIUM mg/dL 8 6   ALBUMIN g/dL 3 5   TOTAL BILIRUBIN mg/dL 0 18*   ALK PHOS U/L 81   ALT U/L 40   AST U/L 35   GLUCOSE RANDOM mg/dL 128                     * I Have Reviewed All Lab Data Listed Above  * Additional Pertinent Lab Tests Reviewed: All Labs Within Last 24 Hours Reviewed    Imaging:    Imaging Reports Reviewed Today Include:  None new      Last 24 Hours Medication List:   Current Facility-Administered Medications   Medication Dose Route Frequency Provider Last Rate    acetaminophen  650 mg Oral Q4H PRN Ivana Ryder MD      acetaminophen  975 mg Oral Q6H PRN Ivana Ryder MD      aluminum-magnesium hydroxide-simethicone  30 mL Oral Q4H PRN Ivana Ryder MD      ascorbic acid  1,000 mg Oral BID RUIZ GaliciaC      benztropine  1 mg Intramuscular Q6H PRN Ivana Ryder MD      cholecalciferol  2,000 Units Oral Daily Rika Carey PA-C      FLUoxetine  20 mg Oral Daily Gaye Mata DO      fluticasone  1 spray Each Nare Daily MARIA DEL ROSARIO Avalos      gabapentin  300 mg Oral Q6H PRN Ivana Ryder MD      LORazepam  2 mg Intramuscular Q6H PRN Ivana Ryder MD      nicotine polacrilex  2 mg Oral Q2H PRN Ivana Ryder MD      OLANZapine  5 mg Intramuscular Q3H PRN Max 3/day Ivana Ryder MD      OLANZapine  5 mg Oral Q4H PRN Max 3/day Ivana Ryder MD      OLANZapine  5 mg Oral Q3H PRN Max 3/day Ivana Ryder MD      polyethylene glycol  17 g Oral Daily PRN Ivana Ryder MD      sodium chloride  1 spray Each Nare Q2H PRN MARIA DEL ROSARIO Santos      zinc sulfate  220 mg Oral Daily Rika Carey PA-C          Today, Patient Was Seen By: MARIA DEL ROSARIO Valentin      ** Please Note: Dictation voice to text software may have been used in the creation of this document   **

## 2021-04-05 NOTE — ASSESSMENT & PLAN NOTE
· Patient complained of headache immediately above the orbits of the eyes  · Patient rating her headache 5/10 and describes the pain mostly as pressure  · VSS  · Order Flonase 50 mcg per spray 1 spray each nares daily  · Order Ocean nasal spray 0 65% 1 spray each naris q 2 hours p r n   · Encourage use of p r n   Medication Tylenol

## 2021-04-05 NOTE — CASE MANAGEMENT
Call made to pt's significant other samuel Rivas, tel# [phone]  Pj [place] reports pt is able to return home with him upon d/c; he will provide pt's d/c transportation and transportation to [place] for pt's follow up treatment and prescriptions  Pj [place] confirmed [facility] are [facility]  Pj Rubio denies any concerns for pt's return home at this time

## 2021-04-05 NOTE — NURSING NOTE
Pt is calm and cooperative with care  Pt is isolative to self and room   Pt is medication compliant , vss and fair intake at dinner time  Pt did report of no  depression but mild  anxiety but did not rate and denies SI  Pt is in her room resting , will continue to monitor

## 2021-04-05 NOTE — CASE MANAGEMENT
04/05/21 5020   Team Meeting   Meeting Type Daily Rounds   Initial Conference Date 04/05/21   Team Members Present   Team Members Present Physician;Nurse;Occupational Therapist;;   Nursing Team Member Cyndy Erwin, [facility] Management Team Member [address]  Work Team Member [place] SALOMON   OT Team Member Alfonso MAYBERRY   Patient/Family Present   Patient Present No   Patient's Family Present No     201 admission from [facility], suicide attempt via overdose on 20 trazodone pills, calm cooperative, pleasant, reports depression and anxiety, did not rate

## 2021-04-05 NOTE — PROGRESS NOTES
Psychiatrist Progress Note - Behavioral Health   Inis Borden 26 y o  female MRN: [id number]  Unit/Bed#: [facility] 624-65 Encounter: [id number]    The patient was seen for continuing care and reviewed with treatment team  Per staff, patient has been calm, cooperative  Patient reports poor sleep due to covid symptoms  She reports ongoing depressed mood  Denies SI  Reports mild anxiety  No delusions elicited       Mental Status Evaluation:  Appearance: fair grooming, intact hygiene, no abnormal involuntary movements noted  Behavior: calm, cooperative  Speech: wnl  Mood: depressed  Affect: congruent, stable, constricted  Thought process: linear, logical  Thought content: no delusions elicited; denies SI  Perceptual disturbances: denies AH/VH  Cognition: well oriented    Insight: fair  Judgement: improving    Vitals:    04/04/21 2043 04/05/21 0500 04/05/21 0900 04/05/21 1618   BP: 137/91  151/92 118/78   BP Location: Right arm  Left arm Right arm   Pulse: 90 78 86 77   Resp: 18 18 18 18   Temp: 98 8 °F (37 1 °C)  (!) 96 8 °F (36 °C) 97 8 °F (36 6 °C)   TempSrc: Tympanic  Tympanic Tympanic   SpO2: 94% 98% 99% 96%   Weight:       Height:           Current Facility-Administered Medications   Medication Dose Route Frequency Provider Last Rate    acetaminophen  650 mg Oral Q4H PRN Penny Robles MD      acetaminophen  975 mg Oral Q6H PRN Penny Robles MD      aluminum-magnesium hydroxide-simethicone  30 mL Oral Q4H PRN Penny Robles MD      ascorbic acid  1,000 mg Oral BID Rika Carey PA-C      benztropine  1 mg Intramuscular Q6H PRN Penny Robles MD      bismuth subsalicylate  819 mg Oral Q6H PRN MARIA DEL ROSARIO Aldridge      cholecalciferol  2,000 Units Oral Daily Rika Carey PA-C      FLUoxetine  20 mg Oral Daily Stana Gaucher, DO      fluticasone  1 spray Each Nare Daily MARIA DEL ROSARIO Avalos      gabapentin  300 mg Oral Q6H PRN Penny Robles MD      LORazepam  2 mg Intramuscular Q6H PRN Penny Robles MD      nicotine polacrilex  2 mg Oral Q2H PRN Marivel Angela MD      OLANZapine  5 mg Intramuscular Q3H PRN Max 3/day Marivel Angela MD      OLANZapine  5 mg Oral Q4H PRN Max 3/day Marivel Angela MD      OLANZapine  5 mg Oral Q3H PRN Max 3/day Marivel Angela MD      polyethylene glycol  17 g Oral Daily PRN Marivel Angela MD      sodium chloride  1 spray Each Nare Q2H PRN MARIA DEL ROSARIO Schumacher      zinc sulfate  220 mg Oral Daily Rkia Carey PA-C             Assessment/Plan    Principal Problem:    Major depressive disorder  Active Problems:    Medical clearance for psychiatric admission    COVID-19    H/O gastric bypass    PTSD (post-traumatic stress disorder)    Headache above the eye region      Progress Toward Goals: improving    Recommended Treatment:   - continue fluoxetine 20mg po daily  - Continue with pharmacotherapy, group therapy, milieu therapy and occupational therapy  Risks, benefits and possible side effects of Medications:   Risks, benefits, and possible side effects of medications explained to patient and patient verbalizes understanding

## 2021-04-05 NOTE — NURSING NOTE
Patient is calm and cooperative with care, medication compliant  Pt denies having any depression and says she has no anxiety at this time, no other S/S reported  Pt reports small pressure headache but refuses PRN medication  No distress noted

## 2021-04-05 NOTE — CASE MANAGEMENT
CM spoke with pt, reviewed role of CM and pt's reason for hospitalization  Pt reports no longer suicidal, denies current ideations or plans  Pt reports being back on medications has helped  Pt acknowledged not taking her wellbutrin due to how it makes her feel  Pt reports recently moved from [place] to [place] to "get away from the noise " pt reports her two children still reside in [place] with her parents, children age 8 and 6  Pt reports she goes to [place] once a week to see her children and to care for her mother  CM questioned mother's need for caregiver if she is noted by pt to be caring for pt's children  Pt reports her mother has lupus and needs assistance  Reports her father does not work and is caring for her children  Pt reports her children did not move with her due to still being in school  Pt then reported that her children were attending school virtually  Pt resides in [facility] with her boyfriend Mel Pedro, tel# [phone]  Pt signed CUONG for [employer]  Pt refused to sign CUONG for her parents  Pt does not drive  Pt reports [employer] provides transportation as needed, including to [place]  Pt reports past inpatient psych admission at [facility]  Pt reports being referred to outpatient care at [facility] but did not follow through due to treatment being done virtually  Pt reports she can not do treatment virtually, "It doesn't work for me " CM reviewed need for outpatient follow up and medication management  CM also informed pt of most treatment centers doing virtual due to covid  Pt now in agreement with outpatient virtual appts  Pt aware of her insurance only covering services and pharmacies in [place]  According to chart review, pt has had 4 previous suicide attempts via overdose  Pt requesting d/c scripts to [facility] [address], [place]  Pt reports she will be able to  meds there  CM offered pharmacy in [facility] which is closer; pt refused       Pt requesting referral to [facility]; Signed CUONG for [employer]  Income currently from Unemployment  Pt acknowledges use of marijuana, reports it is for her anxiety  Pt refusing any treatment options to stop using; reports plan is to obtain medical marijuana card  Pt reports alcohol use is socially only  Pt denies any family history of mental illness, drug/alcohol abuse, and suicide attempts  Pt is Covid +; reports her PCP is Dr Elida Quiroz in [address]  Pt signed CUONG for PCP  Pt limited with information and provides contradictory information

## 2021-04-05 NOTE — PLAN OF CARE
Problem: Alteration in Thoughts and Perception  Goal: Treatment Goal: Gain control of psychotic behaviors/thinking, reduce/eliminate presenting symptoms and demonstrate improved reality functioning upon discharge  Outcome: Progressing  Goal: Verbalize thoughts and feelings  Description: Interventions:  - Promote a nonjudgmental and trusting relationship with the patient through active listening and therapeutic communication  - Assess patient's level of functioning, behavior and potential for risk  - Engage patient in 1 on 1 interactions  - Encourage patient to express fears, feelings, frustrations, and discuss symptoms    - [place] patient to reality, help patient recognize reality-based thinking   - Administer medications as ordered and assess for potential side effects  - Provide the patient education related to the signs and symptoms of the illness and desired effects of prescribed medications  Outcome: Progressing  Goal: Refrain from acting on delusional thinking/internal stimuli  Description: Interventions:  - Monitor patient closely, per order   - Utilize least restrictive measures   - Set reasonable limits, give positive feedback for acceptable   - Administer medications as ordered and monitor of potential side effects  Outcome: Progressing  Goal: Agree to be compliant with medication regime, as prescribed and report medication side effects  Description: Interventions:  - Offer appropriate PRN medication and supervise ingestion; conduct AIMS, as needed   Outcome: Progressing  Goal: Attend and participate in unit activities, including therapeutic, recreational, and educational groups  Description: Interventions:  -Encourage Visitation and family involvement in care  Outcome: Progressing  Goal: Recognize dysfunctional thoughts, communicate reality-based thoughts at the time of discharge  Description: Interventions:  - Provide medication and psycho-education to assist patient in compliance and developing insight into his/her illness   Outcome: Progressing  Goal: Complete daily ADLs, including personal hygiene independently, as able  Description: Interventions:  - Observe, teach, and assist patient with ADLS  - Monitor and promote a balance of rest/activity, with adequate nutrition and elimination   Outcome: Progressing     Problem: Ineffective Coping  Goal: Cooperates with admission process  Description: Interventions:   - Complete admission process  Outcome: Progressing  Goal: Identifies ineffective coping skills  Outcome: Progressing  Goal: Identifies healthy coping skills  Outcome: Progressing  Goal: Demonstrates healthy coping skills  Outcome: Progressing  Goal: Participates in unit activities  Description: Interventions:  - Provide therapeutic environment   - Provide required programming   - Redirect inappropriate behaviors   Outcome: Progressing  Goal: Patient/Family participate in treatment and DC plans  Description: Interventions:  - Provide therapeutic environment  Outcome: Progressing  Goal: Patient/Family verbalizes awareness of resources  Outcome: Progressing  Goal: Understands least restrictive measures  Description: Interventions:  - Utilize least restrictive behavior  Outcome: Progressing  Goal: Free from restraint events  Description: - Utilize least restrictive measures   - Provide behavioral interventions   - Redirect inappropriate behaviors   Outcome: Progressing     Problem: Risk for Self Injury/Neglect  Goal: Treatment Goal: Remain safe during length of stay, learn and adopt new coping skills, and be free of self-injurious ideation, impulses and acts at the time of discharge  Outcome: Progressing  Goal: Verbalize thoughts and feelings  Description: Interventions:  - Assess and re-assess patient's lethality and potential for self-injury  - Engage patient in 1:1 interactions, daily, for a minimum of 15 minutes  - Encourage patient to express feelings, fears, frustrations, hopes  - Establish rapport/trust with patient   Outcome: Progressing  Goal: Refrain from harming self  Description: Interventions:  - Monitor patient closely, per order  - Develop a trusting relationship  - Supervise medication ingestion, monitor effects and side effects   Outcome: Progressing  Goal: Attend and participate in unit activities, including therapeutic, recreational, and educational groups  Description: Interventions:  - Provide therapeutic and educational activities daily, encourage attendance and participation, and document same in the medical record  - Obtain collateral information, encourage visitation and family involvement in care   Outcome: Progressing  Goal: Recognize maladaptive responses and adopt new coping mechanisms  Outcome: Progressing  Goal: Complete daily ADLs, including personal hygiene independently, as able  Description: Interventions:  - Observe, teach, and assist patient with ADLS  - Monitor and promote a balance of rest/activity, with adequate nutrition and elimination  Outcome: Progressing     Problem: Depression  Goal: Treatment Goal: Demonstrate behavioral control of depressive symptoms, verbalize feelings of improved mood/affect, and adopt new coping skills prior to discharge  Outcome: Progressing  Goal: Verbalize thoughts and feelings  Description: Interventions:  - Assess and re-assess patient's level of risk   - Engage patient in 1:1 interactions, daily, for a minimum of 15 minutes   - Encourage patient to express feelings, fears, frustrations, hopes   Outcome: Progressing  Goal: Refrain from harming self  Description: Interventions:  - Monitor patient closely, per order   - Supervise medication ingestion, monitor effects and side effects   Outcome: Progressing  Goal: Refrain from isolation  Description: Interventions:  - Develop a trusting relationship   - Encourage socialization   Outcome: Progressing  Goal: Refrain from self-neglect  Outcome: Progressing  Goal: Attend and participate in unit activities, including therapeutic, recreational, and educational groups  Description: Interventions:  - Provide therapeutic and educational activities daily, encourage attendance and participation, and document same in the medical record   Outcome: Progressing  Goal: Complete daily ADLs, including personal hygiene independently, as able  Description: Interventions:  - Observe, teach, and assist patient with ADLS  -  Monitor and promote a balance of rest/activity, with adequate nutrition and elimination   Outcome: Progressing     Problem: Anxiety  Goal: Anxiety is at manageable level  Description: Interventions:  - Assess and monitor patient's anxiety level  - Monitor for signs and symptoms (heart palpitations, chest pain, shortness of breath, headaches, nausea, feeling jumpy, restlessness, irritable, apprehensive)  - Collaborate with interdisciplinary team and initiate plan and interventions as ordered    - [place] patient to unit/surroundings  - Explain treatment plan  - Encourage participation in care  - Encourage verbalization of concerns/fears  - Identify coping mechanisms  - Assist in developing anxiety-reducing skills  - Administer/offer alternative therapies  - Limit or eliminate stimulants  Outcome: Progressing     Problem: DISCHARGE PLANNING - CARE MANAGEMENT  Goal: Discharge to post-acute care or home with appropriate resources  Description: INTERVENTIONS:  - Conduct assessment to determine patient/family and health care team treatment goals, and need for post-acute services based on payer coverage, community resources, and patient preferences, and barriers to discharge  - Address psychosocial, clinical, and financial barriers to discharge as identified in assessment in conjunction with the patient/family and health care team  - Arrange appropriate level of post-acute services according to patients   needs and preference and payer coverage in collaboration with the physician and health care team  - Communicate with and update the patient/family, physician, and health care team regarding progress on the discharge plan  - Arrange appropriate transportation to post-acute venues  Outcome: Progressing

## 2021-04-06 VITALS
HEART RATE: 74 BPM | HEIGHT: 63 IN | DIASTOLIC BLOOD PRESSURE: 73 MMHG | TEMPERATURE: 98.5 F | SYSTOLIC BLOOD PRESSURE: 119 MMHG | BODY MASS INDEX: 38.98 KG/M2 | WEIGHT: 220 LBS | RESPIRATION RATE: 18 BRPM | OXYGEN SATURATION: 94 %

## 2021-04-06 PROCEDURE — 99238 HOSP IP/OBS DSCHRG MGMT 30/<: CPT | Performed by: PSYCHIATRY & NEUROLOGY

## 2021-04-06 RX ORDER — MELATONIN
2000 DAILY
Qty: 8 TABLET | Refills: 0 | Status: SHIPPED | OUTPATIENT
Start: 2021-04-07 | End: 2021-04-11

## 2021-04-06 RX ORDER — FLUOXETINE HYDROCHLORIDE 40 MG/1
40 CAPSULE ORAL DAILY
Qty: 7 CAPSULE | Refills: 3 | Status: SHIPPED | OUTPATIENT
Start: 2021-04-07

## 2021-04-06 RX ORDER — ZINC SULFATE 50(220)MG
220 CAPSULE ORAL DAILY
Qty: 4 CAPSULE | Refills: 0 | Status: SHIPPED | OUTPATIENT
Start: 2021-04-07 | End: 2021-04-11

## 2021-04-06 RX ADMIN — CHOLECALCIFEROL (VITAMIN D3) 2000 UNITS: 25 TAB ORAL at 09:00

## 2021-04-06 RX ADMIN — OXYCODONE HYDROCHLORIDE AND ACETAMINOPHEN 1000 MG: 500 TABLET ORAL at 17:43

## 2021-04-06 RX ADMIN — ZINC SULFATE 220 MG (50 MG) CAPSULE 220 MG: CAPSULE at 09:00

## 2021-04-06 RX ADMIN — FLUOXETINE 20 MG: 20 CAPSULE ORAL at 09:00

## 2021-04-06 RX ADMIN — FLUTICASONE PROPIONATE 1 SPRAY: 50 SPRAY, METERED NASAL at 09:00

## 2021-04-06 RX ADMIN — OXYCODONE HYDROCHLORIDE AND ACETAMINOPHEN 1000 MG: 500 TABLET ORAL at 09:00

## 2021-04-06 RX ADMIN — GABAPENTIN 300 MG: 300 CAPSULE ORAL at 00:15

## 2021-04-06 NOTE — TREATMENT TEAM
04/06/21 0900   Team Meeting   Meeting Type Daily Rounds   Initial Conference Date 04/06/21   Team Members Present   Team Members Present Physician;Nurse;;Occupational Therapist;Other (Discipline and Name)   Physician Team Member Dr Faith Ca Team Member [facility] Management Team Member Yasmin Fried    OT Team Member Aleyda Dale    Other (Discipline and Name) Tomas      Pt discussed at treatment team today, possible discharge today or tomorrow

## 2021-04-06 NOTE — CASE MANAGEMENT
Call made to pt's PCP Dr Nilo Rouse, confirmed pt is his patient and scheduled d/c follow up telehealth appt for Thurs 4/8, pt to receive call from Dr Nilo Rouse between 12-5 pm  CM notified them of pt's +Covid status  Call made to [facility], spoke with [facility] in intake, tel# [phone]; pt's case was closed; will re-open as new referral  Pt scheduled for virtual intake on Monday 4/26 at 930am  Pt will be assigned clinician and psychiatrist after completing intake appt  Referral confirmation # [id number]

## 2021-04-06 NOTE — DISCHARGE INSTR - APPOINTMENTS
Kasi Bryan RN, our [employer], will be calling you after your discharge, on the phone number that you provided  She will be available as an additional support, if needed  If you wish to speak with her, you may contact Mylene Boyd at [phone]

## 2021-04-06 NOTE — PLAN OF CARE
Problem: DISCHARGE PLANNING - CARE MANAGEMENT  Goal: Discharge to post-acute care or home with appropriate resources  Description: INTERVENTIONS:  - Conduct assessment to determine patient/family and health care team treatment goals, and need for post-acute services based on payer coverage, community resources, and patient preferences, and barriers to discharge  - Address psychosocial, clinical, and financial barriers to discharge as identified in assessment in conjunction with the patient/family and health care team  - Arrange appropriate level of post-acute services according to patients   needs and preference and payer coverage in collaboration with the physician and health care team  - Communicate with and update the patient/family, physician, and health care team regarding progress on the discharge plan  - Arrange appropriate transportation to post-acute venues  Outcome: Adequate for Discharge     DC home with outpatient treatment services through [facility] and PCP  Pt's significant other to provide d/c transportation   Pt requested d/c scripts sent to [address] in [facility]

## 2021-04-06 NOTE — DISCHARGE INSTR - OTHER ORDERS
BEHAVIORAL MEDICINE AT [facility] Crisis Hotline: 6-421.254.4834  *[address] Crisis Hotline: 1-245.665.1441  *National Suicide Prevention Lifeline:  2-948.837.2314  *Alcohol Anonymous: 122.840.4252  *José Miguel[facility]ovanna[facility]rik[facility]ovanna[facility]osal[facility] [employer] & Alcohol Commission: (318) 149-4721  [address] [facility]  on [address] [facility] ([facility]) HELPLINE: 221.746.2315/Website: www deann [employer]  *Substance Abuse and [address] [facility]) [employer], which is a confidential, free, 24-hour-a-day, 365-day-a-year, information service for individuals and family members facing mental health and/or substance use disorders  This service provides referrals to local treatment facilities, support groups, and community-based organizations  Callers can also order free publications and other information  Call 6-803.263.2257/Website: www [facility] gov  *[facility] 2-1-1: This is a toll free, confidential, 24-hour-a-day service which connects you to a community  in your area who can help you find services and resources that are available to you locally and provide critical services that can improve and save lives  Call: 80  /Website: https://[employer]/          What you need to know aboutcoronavirus disease 2019 (COVID-19)     What is coronavirus disease 2019 (COVID-19)? Coronavirus disease 2019 (COVID-19) is a respiratory illness that can spread from person to person  The virus that causes COVID-19 is a novel coronavirus that was first identified during an investigation into an outbreak in [place], [place]  Can people in the [place]  get COVID-19? Yes  COVID-19 is spreading from person to person in parts of the [place]  Risk of infection with COVID-19 is higher for people who are close contacts of someone known to have COVID-19, for example healthcare workers, or household members  Other people at higher risk for infection are those who live in or have recently been in an area with ongoing spread of COVID-19   Learn more about places with ongoing spread at   [facility]  html#geographic  Have there been cases of COVID-19 in the [place] ?   Yes  The first case of COVID-19 in the [place] was reported on January 21, 2020  The current count of cases of COVID-19 in the [place] is available on [employer] at [facility]  How does COVID-19 spread? The virus that causes COVID-19 probably emerged from an animal source, but is now spreading from person to person  The virus is thought to spread mainly between people who are in close contact with one another (within about 6 feet) through respiratory droplets produced when an infected person coughs or sneezes  It also may be possible that a person can get COVID-19 by touching a surface or object that has the virus on it and then touching their own mouth, nose, or possibly their eyes, but this is not thought to be the main way the virus spreads  Learn what is known about the spread of newly emerged coronaviruses at [facility]  What are the symptoms of COVID-19? Patients with COVID-19 have had mild to severe respiratory illness with symptoms of   fever   cough   shortness of breath  What are severe complications from this virus? Some patients have pneumonia in both lungs, multi-organ failure and in some cases death  How can I help protect myself? People can help protect themselves from respiratory illness with everyday preventive actions  Avoid close contact with people who are sick  Avoid touching your eyes, nose, and mouth withunwashed hands  Wash your hands often with soap and water for at least 20 seconds  Use an alcohol-based hand  that contains at least 60% alcohol if soap and water are not available  If you are sick, to keep from spreading respiratory illness to others, you should   Stay home when you are sick  Cover your cough or sneeze with a tissue, then throw the tissue in the trash  Clean and disinfect frequently touched objectsand surfaces  What should I do if I recently traveled from an area with ongoing spread of COVID-19? If you have traveled from an affected area, there may be restrictions on your movements for up to 2 weeks  If you develop symptoms during that period (fever, cough, trouble breathing), seek medical advice  Call the office of your health care provider before you go, and tell them about your travel and your symptoms  They will give you instructions on how to get care without exposing other people to your illness  While sick, avoid contact with people, don't go out and delay any travel to reduce the possibility of spreading illness to others  Is there a treatment? There is no specific antiviral treatment for COVID-19  People with COVID-19 can seek medical care to helprelieve symptoms  For more information: www cdc gov/TMQTU77HJ 507318-Q 03/03/2020       What to do if you are sick withcoronavirus disease 2019 (COVID-19)     If you are sick with COVID-19 or suspect you are infected with the virus that causes COVID-19, follow the steps below to help prevent the disease from spreading to people in your home and community  Stay home except to get medical care   You should restrict activities outside your home, except for getting medical care  Do not go to work, school, or public areas  Avoid using public transportation, ride-sharing, or taxis  Separate yourself from other people and animals inyour home  People: As much as possible, you should stay in a specific room and away from other people in your home  Also, you should use a separate bathroom, if available  Animals: Do not handle pets or other animals while sick  See COVID-19 and Animals for more information    Call ahead before visiting your doctor   If you have a medical appointment, call the healthcare provider and tell them that you have or may have COVID-19  This will help the healthcare provider's office take steps to keep other people from getting infected or exposed  Wear a facemask  You should wear a facemask when you are around other people (e g , sharing a room or vehicle) or pets and before you enter a healthcare provider's office  If you are not able to wear a facemask (for example, because it causes trouble breathing), then people who live with you should not stay in the same room with you, or they should wear a facemask if they enteryour room  Cover your coughs and sneezes   Cover your mouth and nose with a tissue when you cough or sneeze  Throw used tissues in a lined trash can; immediately wash your hands with soap and water for at least 20 seconds or clean your hands with an alcohol-based hand  that contains at least 60 to 95% alcohol, covering all surfaces of your hands and rubbing them together until they feel dry  Soap and water should be used preferentially if hands are visibly dirty  Avoid sharing personal household items   You should not share dishes, drinking glasses, cups, eating utensils, towels, or bedding with other people or pets in your home  After using these items, they should be washed thoroughly with soap and water  Clean your hands often  Wash your hands often with soap and water for at least 20 seconds  If soap and water are not available, clean your hands with an alcohol-based hand  that contains at least 60% alcohol, covering all surfaces of your hands and rubbing them together until they feel dry  Soap and water should be used preferentially if hands are visibly dirty  Avoid touching your eyes, nose, and mouth with unwashed hands  Clean all "high-touch" surfaces every day  High touch surfaces include counters, tabletops, doorknobs, bathroom fixtures, toilets, phones, keyboards, tablets, and bedside tables  Also, clean any surfaces that may have blood, stool, or body fluids on them  Use a household cleaning spray or wipe, according to the label instructions  Labels contain instructions for safe and effective use of the cleaning product including precautions you should take when applying the product, such as wearing gloves and making sure you have good ventilation during use of the product  Monitor your symptoms  Seek prompt medical attention if your illness is worsening (e g , difficulty breathing)  Before seeking care, call your healthcare provider and tell them that you have, or are being evaluated for, COVID-19  Put on a facemask before you enter the facility  These steps will help the healthcare provider's office to keep other people in the office or waiting room from getting infectedor exposed  Ask your healthcare provider to call the local or state health department  Persons who are placed under active monitoring or facilitated self-monitoring should follow instructions provided by their local health department or occupational health professionals, as appropriate  If you have a medical emergency and need to call 911, notify the dispatch personnel that you have, or are being evaluated for COVID-19  If possible, put on a facemask before emergency medical services arrive  Discontinuing home isolation  Patients with confirmed COVID-19 should remain under home isolation precautions until the risk of secondary transmission to others is thought to be low  The decision to discontinue home isolation precautions should be made on a case-by-case basis, in consultation with healthcare providers and state and local health departments  For more information: www cdc gov/LZDKM73RV 450303-Q 02/24/2020       Stay home when you are sick,except to get medical care  Wash your hands often with soap and water for at least 20 seconds  Cover your cough or sneeze with a tissue, then throw the tissue in the trash  Clean and disinfect frequently touched objects and surfaces    Avoid touching your eyes, nose, and mouth  STOP THE SPREAD OF GERMS  For more information: www cdc gov/COVID19 Avoid close contact with people who are sick  Help prevent the spread of respiratory diseases like COVID-19

## 2021-04-06 NOTE — CASE MANAGEMENT
04/06/21 0908   Team Meeting   Meeting Type Daily Rounds   Initial Conference Date 04/06/21   Team Members Present   Team Members Present Physician;Nurse;;Occupational Therapist;   [facility] [address]    Nursing Team Member Priyanka Ballesteros RN   Care Management Team Member [address]  Work Team Member Paula SALOMON   OT Team Member Mine Martell   Patient/Family Present   Patient Present No   Patient's Family Present No     Denies all s/s, med compliant, focused on d/c, calm and cooperative

## 2021-04-06 NOTE — NURSING NOTE
Pt is still isolative to room but very pleasant on approach and cooperative with care  Pt denies S/S  Pt reports wanting to leave today and is excited about possible discharge  Pt is medication compliant

## 2021-04-06 NOTE — DISCHARGE INSTR - AVS FIRST PAGE
You were provided with 1 month's worth of medication for depression treatment   Please follow up with your PCP and obtain additional prescriptions until you can establish care with a psychiatrist

## 2021-04-06 NOTE — BH TRANSITION RECORD
Contact Information: If you have any questions, concerns, pended studies, tests and/or procedures, or emergencies regarding your inpatient behavioral health visit  Please contact [facility] older adult behavioral health unit 6T [phone] and ask to speak to a , nurse or physician  A contact is available 24 hours/ 7 days a week at this number  Summary of Procedures Performed During your Stay:  Below is a list of major procedures performed during your hospital stay and a summary of results:  - No major procedures performed  Pending Studies (From admission, onward)    None        If studies are pending at discharge, follow up with your PCP and/or referring provider

## 2021-04-06 NOTE — NURSING NOTE
Pt calm and cooperative during shift  Pt transported to ED by staff, pt transportation present at ED  Pt received discharge information, all personal inventory  No questions or concerns at time of discharge
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
## 2021-04-06 NOTE — CASE MANAGEMENT
Cm spoke with pt and reviewed d/c plan and appts  Pt in agreement with PCP and [facility] appts  Pt reports her boyfriend Kahlil Bowens will provide d/c transport; requests that CM contact Kahlil Bowens  Pt reports plans to go to [facility] tomorrow to see her kids and  meds  Call made Kahlil Bowens, reviewed pt's d/c today, appts, and pt's meds needing to be picked up at pharmacy in [facility]  Kahlil Bowens reports able to  pt this afternoon and take pt to [place] tomorrow  Kahlil Bowens unsure of  time; reports he will contact pt and notify once time is set  CM reviewed pt's covid status and isolation till 4/13  Kahlil Bowens reports he will be able to isolate by staying with a friend and pt to remain in hotel

## 2021-04-06 NOTE — PLAN OF CARE
Problem: Alteration in Thoughts and Perception  Goal: Treatment Goal: Gain control of psychotic behaviors/thinking, reduce/eliminate presenting symptoms and demonstrate improved reality functioning upon discharge  Outcome: Completed  Goal: Verbalize thoughts and feelings  Description: Interventions:  - Promote a nonjudgmental and trusting relationship with the patient through active listening and therapeutic communication  - Assess patient's level of functioning, behavior and potential for risk  - Engage patient in 1 on 1 interactions  - Encourage patient to express fears, feelings, frustrations, and discuss symptoms    - [place] patient to reality, help patient recognize reality-based thinking   - Administer medications as ordered and assess for potential side effects  - Provide the patient education related to the signs and symptoms of the illness and desired effects of prescribed medications  Outcome: Completed  Goal: Refrain from acting on delusional thinking/internal stimuli  Description: Interventions:  - Monitor patient closely, per order   - Utilize least restrictive measures   - Set reasonable limits, give positive feedback for acceptable   - Administer medications as ordered and monitor of potential side effects  Outcome: Completed  Goal: Agree to be compliant with medication regime, as prescribed and report medication side effects  Description: Interventions:  - Offer appropriate PRN medication and supervise ingestion; conduct AIMS, as needed   Outcome: Completed  Goal: Attend and participate in unit activities, including therapeutic, recreational, and educational groups  Description: Interventions:  -Encourage Visitation and family involvement in care  Outcome: Completed  Goal: Recognize dysfunctional thoughts, communicate reality-based thoughts at the time of discharge  Description: Interventions:  - Provide medication and psycho-education to assist patient in compliance and developing insight into his/her illness   Outcome: Completed  Goal: Complete daily ADLs, including personal hygiene independently, as able  Description: Interventions:  - Observe, teach, and assist patient with ADLS  - Monitor and promote a balance of rest/activity, with adequate nutrition and elimination   Outcome: Completed     Problem: Ineffective Coping  Goal: Cooperates with admission process  Description: Interventions:   - Complete admission process  Outcome: Completed  Goal: Identifies ineffective coping skills  Outcome: Completed  Goal: Identifies healthy coping skills  Outcome: Completed  Goal: Demonstrates healthy coping skills  Outcome: Completed  Goal: Participates in unit activities  Description: Interventions:  - Provide therapeutic environment   - Provide required programming   - Redirect inappropriate behaviors   Outcome: Completed  Goal: Patient/Family participate in treatment and DC plans  Description: Interventions:  - Provide therapeutic environment  Outcome: Completed  Goal: Patient/Family verbalizes awareness of resources  Outcome: Completed  Goal: Understands least restrictive measures  Description: Interventions:  - Utilize least restrictive behavior  Outcome: Completed  Goal: Free from restraint events  Description: - Utilize least restrictive measures   - Provide behavioral interventions   - Redirect inappropriate behaviors   Outcome: Completed     Problem: Risk for Self Injury/Neglect  Goal: Treatment Goal: Remain safe during length of stay, learn and adopt new coping skills, and be free of self-injurious ideation, impulses and acts at the time of discharge  Outcome: Completed  Goal: Verbalize thoughts and feelings  Description: Interventions:  - Assess and re-assess patient's lethality and potential for self-injury  - Engage patient in 1:1 interactions, daily, for a minimum of 15 minutes  - Encourage patient to express feelings, fears, frustrations, hopes  - Establish rapport/trust with patient   Outcome: Completed  Goal: Refrain from harming self  Description: Interventions:  - Monitor patient closely, per order  - Develop a trusting relationship  - Supervise medication ingestion, monitor effects and side effects   Outcome: Completed  Goal: Attend and participate in unit activities, including therapeutic, recreational, and educational groups  Description: Interventions:  - Provide therapeutic and educational activities daily, encourage attendance and participation, and document same in the medical record  - Obtain collateral information, encourage visitation and family involvement in care   Outcome: Completed  Goal: Recognize maladaptive responses and adopt new coping mechanisms  Outcome: Completed  Goal: Complete daily ADLs, including personal hygiene independently, as able  Description: Interventions:  - Observe, teach, and assist patient with ADLS  - Monitor and promote a balance of rest/activity, with adequate nutrition and elimination  Outcome: Completed     Problem: Depression  Goal: Treatment Goal: Demonstrate behavioral control of depressive symptoms, verbalize feelings of improved mood/affect, and adopt new coping skills prior to discharge  Outcome: Completed  Goal: Verbalize thoughts and feelings  Description: Interventions:  - Assess and re-assess patient's level of risk   - Engage patient in 1:1 interactions, daily, for a minimum of 15 minutes   - Encourage patient to express feelings, fears, frustrations, hopes   Outcome: Completed  Goal: Refrain from harming self  Description: Interventions:  - Monitor patient closely, per order   - Supervise medication ingestion, monitor effects and side effects   Outcome: Completed  Goal: Refrain from isolation  Description: Interventions:  - Develop a trusting relationship   - Encourage socialization   Outcome: Completed  Goal: Refrain from self-neglect  Outcome: Completed  Goal: Attend and participate in unit activities, including therapeutic, recreational, and educational groups  Description: Interventions:  - Provide therapeutic and educational activities daily, encourage attendance and participation, and document same in the medical record   Outcome: Completed  Goal: Complete daily ADLs, including personal hygiene independently, as able  Description: Interventions:  - Observe, teach, and assist patient with ADLS  -  Monitor and promote a balance of rest/activity, with adequate nutrition and elimination   Outcome: Completed     Problem: Anxiety  Goal: Anxiety is at manageable level  Description: Interventions:  - Assess and monitor patient's anxiety level  - Monitor for signs and symptoms (heart palpitations, chest pain, shortness of breath, headaches, nausea, feeling jumpy, restlessness, irritable, apprehensive)  - Collaborate with interdisciplinary team and initiate plan and interventions as ordered    - [place] patient to unit/surroundings  - Explain treatment plan  - Encourage participation in care  - Encourage verbalization of concerns/fears  - Identify coping mechanisms  - Assist in developing anxiety-reducing skills  - Administer/offer alternative therapies  - Limit or eliminate stimulants  Outcome: Completed     Problem: DISCHARGE PLANNING - CARE MANAGEMENT  Goal: Discharge to post-acute care or home with appropriate resources  Description: INTERVENTIONS:  - Conduct assessment to determine patient/family and health care team treatment goals, and need for post-acute services based on payer coverage, community resources, and patient preferences, and barriers to discharge  - Address psychosocial, clinical, and financial barriers to discharge as identified in assessment in conjunction with the patient/family and health care team  - Arrange appropriate level of post-acute services according to patients   needs and preference and payer coverage in collaboration with the physician and health care team  - Communicate with and update the patient/family, physician, and health care team regarding progress on the discharge plan  - Arrange appropriate transportation to post-acute venues  Outcome: Completed

## 2021-05-04 NOTE — ED PROVIDER NOTES
History  Chief Complaint   Patient presents with    Overdose - Intentional     Pt states that she took 20 Trazadone along with drinking Hennasy approx 30 min ago with the intent of killing herself  History provided by:  Patient   used: No    Overdose - Intentional  Ingested substance:  Prescription medication and alcohol  Time since incident:  35 minutes  Ingestion amount:  "20 pills"  Witnesses present: yes    Witnessed by:  Alexei Lester poison control: no    Incident location:  Another residence  Associated symptoms: no abdominal pain, no chest pain, no cough, no nausea, no shortness of breath and no vomiting         24-year-old female presents for evaluation after an intentional overdose  The patient states that she took 20 pills of trazodone (patient is unsure of dose) along with about a cup of cognac about 35 minutes prior to arrival   She states that she was drinking and took the medication because I do not want to be here anymore "    None       Past Medical History:   Diagnosis Date    Anxiety     Depression     Schizophrenia ([facility] [address] )        Past Surgical History:   Procedure Laterality Date    BARIATRIC SURGERY      gastric sleeve     SECTION         History reviewed  No pertinent family history  I have reviewed and agree with the history as documented      E-Cigarette/Vaping    E-Cigarette Use Current Every Day User      E-Cigarette/Vaping Substances    Nicotine No     THC No     CBD Yes     Flavoring No     Other No     Unknown No      Social History     Tobacco Use    Smoking status: Never Smoker    Smokeless tobacco: Never Used   Substance Use Topics    Alcohol use: Yes     Frequency: 2-4 times a month     Drinks per session: 3 or 4     Comment: Pt reports "social drinker"    Drug use: Yes     Types: Marijuana     Comment: Pt reports using occasional THC "which helps her anxiety"       Review of Systems   Constitutional: Negative for chills and fever    HENT: Negative for ear pain and sore throat  Eyes: Negative for pain and visual disturbance  Respiratory: Negative for cough and shortness of breath  Cardiovascular: Negative for chest pain and palpitations  Gastrointestinal: Negative for abdominal pain, nausea and vomiting  Genitourinary: Negative for dysuria and hematuria  Musculoskeletal: Negative for arthralgias and back pain  Skin: Negative for color change and rash  Neurological: Negative for seizures and syncope  All other systems reviewed and are negative  Physical Exam  Physical Exam  Constitutional:       Appearance: Normal appearance  She is obese  She is not toxic-appearing  HENT:      Head: Normocephalic and atraumatic  Nose: Nose normal       Mouth/Throat:      Mouth: Mucous membranes are moist       Pharynx: Oropharynx is clear  Eyes:      Extraocular Movements: Extraocular movements intact  Conjunctiva/sclera: Conjunctivae normal       Pupils: Pupils are equal, round, and reactive to light  Neck:      Musculoskeletal: Normal range of motion and neck supple  No neck rigidity  Cardiovascular:      Rate and Rhythm: Normal rate and regular rhythm  Pulses: Normal pulses  Pulmonary:      Effort: Pulmonary effort is normal  No respiratory distress  Breath sounds: Normal breath sounds  Abdominal:      General: There is no distension  Palpations: Abdomen is soft  Tenderness: There is no abdominal tenderness  Musculoskeletal: Normal range of motion  General: No swelling, tenderness or signs of injury  Skin:     General: Skin is warm and dry  Capillary Refill: Capillary refill takes less than 2 seconds  Findings: No rash  Neurological:      General: No focal deficit present  Mental Status: She is alert and oriented to person, place, and time  Motor: No weakness  Coordination: Coordination normal       Comments: Somnolent but easily arousable    No focal deficits  Psychiatric:         Mood and Affect: Mood normal          Thought Content: Thought content normal          Vital Signs  ED Triage Vitals [04/03/21 0028]   Temperature Pulse Respirations Blood Pressure SpO2   97 6 °F (36 4 °C) 92 18 126/80 98 %      Temp Source Heart Rate Source Patient Position - Orthostatic VS BP Location FiO2 (%)   Tympanic Monitor Sitting Left arm --      Pain Score       No Pain           Vitals:    04/03/21 0930 04/03/21 1030 04/03/21 1226 04/03/21 1748   BP: 114/64 118/70 116/69 114/68   Pulse: 85 86 87 83   Patient Position - Orthostatic VS:  Lying Lying Lying         Visual Acuity  Visual Acuity      Most Recent Value   L Pupil Size (mm)  2   R Pupil Size (mm)  2          ED Medications  Medications   sodium chloride 0 9 % bolus 1,000 mL (0 mL Intravenous Stopped 4/3/21 0248)   charcoal activated (WHITEHEAD INSTA-LEONELA) oral liquid 25 g (25 g Oral Given 4/3/21 0055)   loperamide (IMODIUM) capsule 2 mg (2 mg Oral Given 4/3/21 1514)       Diagnostic Studies  Results Reviewed     Procedure Component Value Units Date/Time    Rapid drug screen, urine [[id number]]  (Normal) Collected: 04/03/21 1507    Lab Status: Final result Specimen: Urine, Clean Catch Updated: 04/03/21 1522     Amph/Meth UR Negative     Barbiturate Ur Negative     Benzodiazepine Urine Negative     Cocaine Urine Negative     Methadone Urine Negative     Opiate Urine Negative     PCP Ur Negative     THC Urine Negative     Oxycodone Urine Negative    Narrative:      FOR MEDICAL PURPOSES ONLY  IF CONFIRMATION NEEDED PLEASE CONTACT THE LAB WITHIN 5 DAYS      Drug Screen Cutoff Levels:  AMPHETAMINE/METHAMPHETAMINES  1000 ng/mL  BARBITURATES     200 ng/mL  BENZODIAZEPINES     200 ng/mL  COCAINE      300 ng/mL  METHADONE      300 ng/mL  OPIATES      300 ng/mL  PHENCYCLIDINE     25 ng/mL  THC       50 ng/mL  OXYCODONE      100 ng/mL    POCT pregnancy, urine [[id number]]  (Normal) Resulted: 04/03/21 1508    Lab Status: Final result Updated: 04/03/21 1508     EXT PREG TEST UR (Ref: Negative) negative     Control valid    COVID19, Influenza A/B, RSV PCR, [facility] [[id number]]  (Abnormal) Collected: 04/03/21 1222    Lab Status: Final result Specimen: Nares from Nasopharyngeal Swab Updated: 04/03/21 1313     SARS-CoV-2 Positive     INFLUENZA A PCR Negative     INFLUENZA B PCR Negative     RSV PCR Negative    Narrative: This test has been authorized by FDA under an EUA (Emergency Use Assay) for use by authorized laboratories  Clinical caution and judgement should be used with the interpretation of these results with consideration of the clinical impression and other laboratory testing  Testing reported as "Positive" or "Negative" has been proven to be accurate according to standard laboratory validation requirements  All testing is performed with control materials showing appropriate reactivity at standard intervals      Basic metabolic panel [[id number]] Collected: 04/03/21 0050    Lab Status: Final result Specimen: Blood from Arm, Right Updated: 04/03/21 0128     Sodium 139 mmol/L      Potassium 3 5 mmol/L      Chloride 102 mmol/L      CO2 25 mmol/L      ANION GAP 12 mmol/L      BUN 7 mg/dL      Creatinine 0 93 mg/dL      Glucose 128 mg/dL      Calcium 8 6 mg/dL      eGFR 94 ml/min/1 73sq m     Narrative:      [place] guidelines for Chronic Kidney Disease (CKD):     Stage 1 with normal or high GFR (GFR > 90 mL/min/1 73 square meters)    Stage 2 Mild CKD (GFR = 60-89 mL/min/1 73 square meters)    Stage 3A Moderate CKD (GFR = 45-59 mL/min/1 73 square meters)    Stage 3B Moderate CKD (GFR = 30-44 mL/min/1 73 square meters)    Stage 4 Severe CKD (GFR = 15-29 mL/min/1 73 square meters)    Stage 5 End Stage CKD (GFR <15 mL/min/1 73 square meters)  Note: GFR calculation is accurate only with a steady state creatinine    Ethanol [[id number]]  (Normal) Collected: 04/03/21 0050    Lab Status: Final result Specimen: Blood from Arm, Right Updated: 04/03/21 0120     Ethanol Lvl <3 0 mg/dL     Hepatic function panel [[id number]]  (Abnormal) Collected: 04/03/21 0050    Lab Status: Final result Specimen: Blood from Arm, Right Updated: 04/03/21 0113     Total Bilirubin 0 18 mg/dL      Bilirubin, Direct 0 11 mg/dL      Alkaline Phosphatase 81 U/L      AST 35 U/L      ALT 40 U/L      Total Protein 7 7 g/dL      Albumin 3 5 g/dL     Lipase [[id number]]  (Normal) Collected: 04/03/21 0050    Lab Status: Final result Specimen: Blood from Arm, Right Updated: 04/03/21 0113     Lipase 135 u/L     Salicylate level [[id number]]  (Abnormal) Collected: 04/03/21 0050    Lab Status: Final result Specimen: Blood from Arm, Right Updated: 63/08/63 7975     Salicylate Lvl <3 mg/dL     Acetaminophen level-If concentration is detectable, please discuss with medical  on call   [[id number]]  (Abnormal) Collected: 04/03/21 0050    Lab Status: Final result Specimen: Blood from Arm, Right Updated: 04/03/21 0113     Acetaminophen Level <2 0 ug/mL     CBC and differential [[id number]]  (Abnormal) Collected: 04/03/21 0050    Lab Status: Final result Specimen: Blood from Arm, Right Updated: 04/03/21 0055     WBC 3 38 Thousand/uL      RBC 4 24 Million/uL      Hemoglobin 12 4 g/dL      Hematocrit 39 5 %      MCV 93 fL      MCH 29 2 pg      MCHC 31 4 g/dL      RDW 13 7 %      MPV 9 3 fL      Platelets 570 Thousands/uL      nRBC 0 /100 WBCs      Neutrophils Relative 34 %      Immat GRANS % 1 %      Lymphocytes Relative 46 %      Monocytes Relative 17 %      Eosinophils Relative 2 %      Basophils Relative 0 %      Neutrophils Absolute 1 16 Thousands/µL      Immature Grans Absolute 0 02 Thousand/uL      Lymphocytes Absolute 1 54 Thousands/µL      Monocytes Absolute 0 58 Thousand/µL      Eosinophils Absolute 0 07 Thousand/µL      Basophils Absolute 0 01 Thousands/µL                  No orders to display              Procedures  Procedures         ED Course  ED Course as of May 04 0808   Sat Apr 03, 2021   0050 EKG reviewed independently by me  Normal sinus rhythm at rate of 88  Normal axis and normal intervals  Specifically QTC is 433  There are nonspecific ST abnormalities in the inferior and lateral leads  No acute ischemic changes  May have minimal voltage criteria for LVH which may be normal variant  SBIRT 20yo+      Most Recent Value   SBIRT (24 yo +)   In order to provide better care to our patients, we are screening all of our patients for alcohol and drug use  Would it be okay to ask you these screening questions? Yes Filed at: 04/03/2021 1422   Initial Alcohol Screen: US AUDIT-C    1  How often do you have a drink containing alcohol? 2 Filed at: 04/03/2021 1422   2  How many drinks containing alcohol do you have on a typical day you are drinking? 1 Filed at: 04/03/2021 1422   3a  Male UNDER 65: How often do you have five or more drinks on one occasion? 0 Filed at: 04/03/2021 1422   3b  FEMALE Any Age, or MALE 65+: How often do you have 4 or more drinks on one occassion? 0 Filed at: 04/03/2021 1422   Audit-C Score  3 Filed at: 04/03/2021 1422   TOMY: How many times in the past year have you    Used an illegal drug or used a prescription medication for non-medical reasons? Once or Twice Filed at: 04/03/2021 1422   DAST-10: In the past 12 months      1  Have you used drugs other than those required for medical reasons? 1 Filed at: 04/03/2021 1422   2  Do you use more than one drug at a time? 0 Filed at: 04/03/2021 1422   3  Have you had medical problems as a result of your drug use (e g , memory loss, hepatitis, convulsions, bleeding, etc )? 0 Filed at: 04/03/2021 1422   4  Have you had "blackouts" or "flashbacks" as a result of drug use? YesNo  0 Filed at: 04/03/2021 1422   5  Do you ever feel bad or guilty about your drug use? 0 Filed at: 04/03/2021 1422   6   Does your spouse (or parent) ever complain about your involvement with drugs? 0 Filed at: 04/03/2021 1422   7  Have you neglected your family because of your use of drugs? 0 Filed at: 04/03/2021 1422   8  Have you engaged in illegal activities in order to obtain drugs? 0 Filed at: 04/03/2021 1422   9  Have you ever experienced withdrawal symptoms (felt sick) when you stopped taking drugs? 0 Filed at: 04/03/2021 1422   10  Are you always able to stop using drugs when you want to?  0 Filed at: 04/03/2021 1422   DAST-10 Score  1 Filed at: 04/03/2021 1422                    MDM  Number of Diagnoses or Management Options  Intentional drug overdose [facility]): new and requires workup  Suicide attempt [facility]): new and requires workup     Amount and/or Complexity of Data Reviewed  Clinical lab tests: reviewed and ordered  Tests in the radiology section of CPT®: reviewed and ordered  Review and summarize past medical records: yes  Discuss the patient with other providers: yes        Disposition  Final diagnoses:   Intentional drug overdose ([facility] [address] )   Suicide attempt [facility])     Time reflects when diagnosis was documented in both MDM as applicable and the Disposition within this note     Time User Action Codes Description Comment    4/3/2021 12:59 AM Braselton Axon Add [T50 902A] Intentional drug overdose ([facility] [address] )     4/3/2021 12:59 AM Yaneth [email or web address] [facility]     4/3/2021  5:54 PM Kehinde Mathur Add [Z00 8] Medical clearance for psychiatric admission       ED Disposition     ED Disposition Condition Date/Time Comment    Transfer to [facility] Apr 3, 2021 0059 [facility] Stephany should be transferred out to [facility] and has been medically cleared           MD Documentation      Most Recent Value   Patient Condition  The patient has been stabilized such that within reasonable medical probability, no material deterioration of the patient condition or the condition of the unborn child(sundar) is likely to result from the transfer   Reason for Transfer  Level of Care needed not available at this facility   Benefits of Transfer  Specialized equipment and/or services available at the receiving facility (Include comment)________________________ [inpatient behavioral health treatment]   Risks of Transfer  Potential for delay in receiving treatment   Accepting Physician  Dr Luis Sanchez Name, [address], [place], [place]    (Name & Tel number)  Clemente Geller, [phone]   Sending MD Dr Myron Lowe   Provider Certification  General risk, such as traffic hazards, adverse weather conditions, rough terrain or turbulence, possible failure of equipment (including vehicle or aircraft), or consequences of actions of persons outside the control of the transport personnel      RN Documentation      [address] Name, [address], [place], [place]    (Name & Tel number)  Clemente Geller, [phone]   Level of Care  Basic life support   Patient Belongings Disposition  Sent with patient      Follow-up Information    None         There are no discharge medications for this patient  No discharge procedures on file      PDMP Review     None          ED Provider  Electronically Signed by           Jose Yeboah MD  05/04/21 6781